# Patient Record
Sex: MALE | Race: WHITE | Employment: OTHER | URBAN - METROPOLITAN AREA
[De-identification: names, ages, dates, MRNs, and addresses within clinical notes are randomized per-mention and may not be internally consistent; named-entity substitution may affect disease eponyms.]

---

## 2017-09-13 DIAGNOSIS — I10 HTN (HYPERTENSION): ICD-10-CM

## 2017-09-14 RX ORDER — LISINOPRIL 20 MG/1
TABLET ORAL
Qty: 30 TABLET | Refills: 8 | OUTPATIENT
Start: 2017-09-14

## 2017-09-25 ENCOUNTER — OFFICE VISIT (OUTPATIENT)
Dept: FAMILY MEDICINE CLINIC | Age: 37
End: 2017-09-25

## 2017-09-25 VITALS
HEIGHT: 72 IN | BODY MASS INDEX: 31.98 KG/M2 | TEMPERATURE: 97.4 F | HEART RATE: 124 BPM | RESPIRATION RATE: 18 BRPM | SYSTOLIC BLOOD PRESSURE: 160 MMHG | WEIGHT: 236.11 LBS | DIASTOLIC BLOOD PRESSURE: 88 MMHG | OXYGEN SATURATION: 98 %

## 2017-09-25 DIAGNOSIS — I10 ESSENTIAL HYPERTENSION: Primary | ICD-10-CM

## 2017-09-25 DIAGNOSIS — R06.2 WHEEZING: ICD-10-CM

## 2017-09-25 PROCEDURE — 99214 OFFICE O/P EST MOD 30 MIN: CPT | Performed by: NURSE PRACTITIONER

## 2017-09-25 RX ORDER — LISINOPRIL 20 MG/1
20 TABLET ORAL DAILY
Qty: 30 TABLET | Refills: 0 | Status: SHIPPED | OUTPATIENT
Start: 2017-09-25 | End: 2017-11-20 | Stop reason: SDUPTHER

## 2017-09-25 RX ORDER — ALBUTEROL SULFATE 90 UG/1
2 AEROSOL, METERED RESPIRATORY (INHALATION) EVERY 6 HOURS PRN
Qty: 1 INHALER | Refills: 0 | Status: SHIPPED | OUTPATIENT
Start: 2017-09-25 | End: 2020-08-17 | Stop reason: ALTCHOICE

## 2017-09-26 ASSESSMENT — ENCOUNTER SYMPTOMS
VOICE CHANGE: 0
EYE REDNESS: 0
COUGH: 0
EYE DISCHARGE: 0
SORE THROAT: 0
SINUS PRESSURE: 0
SHORTNESS OF BREATH: 0
WHEEZING: 1
CHEST TIGHTNESS: 0

## 2017-11-17 ENCOUNTER — TELEPHONE (OUTPATIENT)
Dept: FAMILY MEDICINE CLINIC | Age: 37
End: 2017-11-17

## 2017-11-17 DIAGNOSIS — I10 ESSENTIAL HYPERTENSION: ICD-10-CM

## 2017-11-20 RX ORDER — LISINOPRIL 20 MG/1
20 TABLET ORAL DAILY
Qty: 15 TABLET | Refills: 0 | Status: SHIPPED | OUTPATIENT
Start: 2017-11-20 | End: 2017-12-01 | Stop reason: SDUPTHER

## 2017-12-01 ENCOUNTER — OFFICE VISIT (OUTPATIENT)
Dept: FAMILY MEDICINE CLINIC | Age: 37
End: 2017-12-01
Payer: COMMERCIAL

## 2017-12-01 VITALS
WEIGHT: 239.6 LBS | SYSTOLIC BLOOD PRESSURE: 132 MMHG | HEART RATE: 100 BPM | BODY MASS INDEX: 32.45 KG/M2 | DIASTOLIC BLOOD PRESSURE: 70 MMHG | HEIGHT: 72 IN | OXYGEN SATURATION: 98 % | TEMPERATURE: 97.4 F

## 2017-12-01 DIAGNOSIS — M25.561 CHRONIC PAIN OF BOTH KNEES: ICD-10-CM

## 2017-12-01 DIAGNOSIS — I10 ESSENTIAL HYPERTENSION: Primary | ICD-10-CM

## 2017-12-01 DIAGNOSIS — B35.3 TINEA PEDIS OF BOTH FEET: ICD-10-CM

## 2017-12-01 DIAGNOSIS — G89.29 CHRONIC PAIN OF BOTH KNEES: ICD-10-CM

## 2017-12-01 DIAGNOSIS — Z00.00 ROUTINE GENERAL MEDICAL EXAMINATION AT A HEALTH CARE FACILITY: ICD-10-CM

## 2017-12-01 DIAGNOSIS — M25.562 CHRONIC PAIN OF BOTH KNEES: ICD-10-CM

## 2017-12-01 DIAGNOSIS — Z13.220 SCREENING FOR LIPID DISORDERS: ICD-10-CM

## 2017-12-01 PROCEDURE — 99214 OFFICE O/P EST MOD 30 MIN: CPT | Performed by: NURSE PRACTITIONER

## 2017-12-01 RX ORDER — LISINOPRIL 20 MG/1
20 TABLET ORAL DAILY
Qty: 30 TABLET | Refills: 5 | Status: SHIPPED | OUTPATIENT
Start: 2017-12-01 | End: 2018-08-03 | Stop reason: SDUPTHER

## 2017-12-01 RX ORDER — KETOCONAZOLE 20 MG/G
CREAM TOPICAL
Qty: 30 G | Refills: 1 | Status: SHIPPED | OUTPATIENT
Start: 2017-12-01 | End: 2020-01-03

## 2017-12-01 ASSESSMENT — ENCOUNTER SYMPTOMS
ABDOMINAL PAIN: 0
SHORTNESS OF BREATH: 0
NAUSEA: 0
CHEST TIGHTNESS: 0
BLOOD IN STOOL: 0
DIARRHEA: 0
COUGH: 0
VOMITING: 0
CONSTIPATION: 0

## 2017-12-01 ASSESSMENT — PATIENT HEALTH QUESTIONNAIRE - PHQ9
2. FEELING DOWN, DEPRESSED OR HOPELESS: 0
SUM OF ALL RESPONSES TO PHQ QUESTIONS 1-9: 0
SUM OF ALL RESPONSES TO PHQ9 QUESTIONS 1 & 2: 0
1. LITTLE INTEREST OR PLEASURE IN DOING THINGS: 0

## 2017-12-01 NOTE — PATIENT INSTRUCTIONS
Patient Education        Athlete's Foot: Care Instructions  Your Care Instructions  Athlete's foot is an itchy rash on the foot caused by an infection with a fungus. You can get it by going barefoot in wet public areas, such as swimming pools or locker rooms. Many times there is no clear reason why you get athlete's foot. You can easily treat athlete's foot by putting medicine on your feet for 1 to 6 weeks. In some cases, a doctor may prescribe pills to kill the fungus. Follow-up care is a key part of your treatment and safety. Be sure to make and go to all appointments, and call your doctor if you are having problems. It's also a good idea to know your test results and keep a list of the medicines you take. How can you care for yourself at home? · Your doctor may suggest an over-the counter lotion or spray or may prescribe a medicine. Take your medicines exactly as prescribed. Call your doctor if you think you are having a problem with your medicine. · Keep your feet clean and dry. · When you get dressed, put your socks on before your underwear. This can prevent the fungus from spreading from your feet to your groin. To prevent athlete's foot  · Wear flip-flops or other shower sandals in public locker rooms and showers and by the pool. · Dry between your toes after swimming or bathing. · Wear leather shoes or sandals, which let air get to your feet. · Change your socks as needed so your feet stay as dry as possible. · Use antifungal powder on your feet. When should you call for help? Watch closely for changes in your health, and be sure to contact your doctor if:  · You do not get better as expected. Where can you learn more? Go to https://chguillermo.KargoCard. org and sign in to your FinanceAcar account. Enter M498 in the Brain Sentry box to learn more about \"Athlete's Foot: Care Instructions. \"     If you do not have an account, please click on the \"Sign Up Now\" link.   Current as of:

## 2017-12-01 NOTE — PROGRESS NOTES
Visit Information    Have you changed or started any medications since your last visit including any over-the-counter medicines, vitamins, or herbal medicines? no   Have you stopped taking any of your medications? Is so, why? -  no  Are you having any side effects from any of your medications? - no    Have you seen any other physician or provider since your last visit?  no   Have you had any other diagnostic tests since your last visit?  no   Have you been seen in the emergency room and/or had an admission in a hospital since we last saw you?  no   Have you had your routine dental cleaning in the past 6 months?  no     Do you have an active MyChart account? If no, what is the barrier?   No: declined    Patient Care Team:  Mare Lyles CNP as PCP - General (Nurse Practitioner)  La Willams DO as PCP - S Attributed Provider  Mare Lyles CNP as Referring Physician (Nurse Practitioner)    Medical History Review  Past Medical, Family, and Social History reviewed and  contribute to the patient presenting condition    Health Maintenance   Topic Date Due    HIV screen  05/25/1996    DTaP/Tdap/Td vaccine (1 - Tdap) 01/02/2009    Flu vaccine (1) 09/01/2017

## 2017-12-05 ENCOUNTER — TELEPHONE (OUTPATIENT)
Dept: ORTHOPEDIC SURGERY | Age: 37
End: 2017-12-05

## 2017-12-05 NOTE — TELEPHONE ENCOUNTER
Called patient regarding orthopedic referral received. Left message for patient to call the office at earliest convenience to schedule an appointment.

## 2018-01-09 DIAGNOSIS — M25.561 RIGHT KNEE PAIN, UNSPECIFIED CHRONICITY: Primary | ICD-10-CM

## 2018-01-11 ENCOUNTER — OFFICE VISIT (OUTPATIENT)
Dept: ORTHOPEDIC SURGERY | Age: 38
End: 2018-01-11
Payer: COMMERCIAL

## 2018-01-11 VITALS — BODY MASS INDEX: 31.15 KG/M2 | WEIGHT: 230 LBS | HEIGHT: 72 IN

## 2018-01-11 DIAGNOSIS — M23.92 INTERNAL DERANGEMENT OF LEFT KNEE: ICD-10-CM

## 2018-01-11 DIAGNOSIS — M23.91 INTERNAL DERANGEMENT OF RIGHT KNEE: ICD-10-CM

## 2018-01-11 DIAGNOSIS — M25.562 LEFT KNEE PAIN, UNSPECIFIED CHRONICITY: ICD-10-CM

## 2018-01-11 PROCEDURE — 99243 OFF/OP CNSLTJ NEW/EST LOW 30: CPT | Performed by: ORTHOPAEDIC SURGERY

## 2018-01-11 ASSESSMENT — ENCOUNTER SYMPTOMS
COUGH: 0
APNEA: 0
ABDOMINAL PAIN: 0
CHEST TIGHTNESS: 0
VOMITING: 0

## 2018-01-11 NOTE — PROGRESS NOTES
History    Not on file. Social History Main Topics    Smoking status: Never Smoker    Smokeless tobacco: Current User    Alcohol use No      Comment: 5 a day    Drug use: No    Sexual activity: Not on file     Other Topics Concern    Not on file     Social History Narrative    No narrative on file       Family History:  Family History   Problem Relation Age of Onset    Hypertension Mother     Migraines Mother     Arthritis Father     Hypertension Maternal Grandmother     Hypertension Maternal Grandfather     Diabetes Maternal Grandfather     Arthritis Paternal Grandmother     Stroke Paternal Grandfather     Kidney Disease Paternal Grandfather     Arthritis Paternal Grandfather     Heart Disease Other     Lung Cancer Other          REVIEW OF SYSTEMS:  Review of Systems   Constitutional: Negative for chills and fever. Respiratory: Negative for apnea, cough and chest tightness. Cardiovascular: Negative for chest pain and palpitations. Gastrointestinal: Negative for abdominal pain and vomiting. Genitourinary: Negative for difficulty urinating. Musculoskeletal: Positive for arthralgias (Bilateral knees). Negative for gait problem, joint swelling and myalgias. Neurological: Negative for dizziness, weakness and numbness. I have reviewed the CC, HPI, ROS, PMH, FHX, Social History. I agree with the documentation provided by other staff, residents, and/or medical students and have reviewed their documentation prior to providing my signature indicating agreement. PHYSICAL EXAM:  Ht 6' (1.829 m)   Wt 230 lb (104.3 kg)   BMI 31.19 kg/m²  Body mass index is 31.19 kg/m². Physical Exam  Gen: alert and oriented  Psych:  Appropriate affect; Appropriate knowledge base; Appropriate mood; No hallucinations;    Head: normocephalic atraumatic   Chest: symmetric chest excursion  Pelvis: stable  Ortho Exam  Extremity:  Evaluation of the Right knee shows no erythema, warmth, skin lesions,

## 2018-04-23 ENCOUNTER — OFFICE VISIT (OUTPATIENT)
Dept: FAMILY MEDICINE CLINIC | Age: 38
End: 2018-04-23
Payer: COMMERCIAL

## 2018-04-23 VITALS
SYSTOLIC BLOOD PRESSURE: 148 MMHG | DIASTOLIC BLOOD PRESSURE: 68 MMHG | HEIGHT: 72 IN | TEMPERATURE: 97.5 F | WEIGHT: 245 LBS | RESPIRATION RATE: 18 BRPM | HEART RATE: 99 BPM | OXYGEN SATURATION: 97 % | BODY MASS INDEX: 33.18 KG/M2

## 2018-04-23 DIAGNOSIS — J01.90 ACUTE BACTERIAL SINUSITIS: ICD-10-CM

## 2018-04-23 DIAGNOSIS — B96.89 ACUTE BACTERIAL SINUSITIS: ICD-10-CM

## 2018-04-23 DIAGNOSIS — R51.9 SINUS HEADACHE: Primary | ICD-10-CM

## 2018-04-23 PROCEDURE — 99214 OFFICE O/P EST MOD 30 MIN: CPT | Performed by: NURSE PRACTITIONER

## 2018-04-23 RX ORDER — AMOXICILLIN AND CLAVULANATE POTASSIUM 875; 125 MG/1; MG/1
1 TABLET, FILM COATED ORAL 2 TIMES DAILY
Qty: 20 TABLET | Refills: 0 | Status: SHIPPED | OUTPATIENT
Start: 2018-04-23 | End: 2018-05-03

## 2018-04-23 RX ORDER — FLUTICASONE PROPIONATE 50 MCG
2 SPRAY, SUSPENSION (ML) NASAL DAILY
Qty: 1 BOTTLE | Refills: 0 | Status: SHIPPED | OUTPATIENT
Start: 2018-04-23 | End: 2020-01-03

## 2018-04-23 ASSESSMENT — ENCOUNTER SYMPTOMS
EYE PAIN: 0
VOICE CHANGE: 0
WHEEZING: 0
VOMITING: 0
SORE THROAT: 0
EYE WATERING: 0
BLURRED VISION: 0
VISUAL CHANGE: 0
EYE REDNESS: 0
CHEST TIGHTNESS: 0
NAUSEA: 0
PHOTOPHOBIA: 1
EYE DISCHARGE: 0
RHINORRHEA: 1
SINUS PRESSURE: 0
COUGH: 0
SHORTNESS OF BREATH: 0
SCALP TENDERNESS: 0

## 2018-08-03 DIAGNOSIS — I10 ESSENTIAL HYPERTENSION: ICD-10-CM

## 2018-08-06 RX ORDER — LISINOPRIL 20 MG/1
TABLET ORAL
Qty: 90 TABLET | Refills: 1 | Status: SHIPPED | OUTPATIENT
Start: 2018-08-06 | End: 2019-05-04 | Stop reason: SDUPTHER

## 2018-09-06 ENCOUNTER — OFFICE VISIT (OUTPATIENT)
Dept: FAMILY MEDICINE CLINIC | Age: 38
End: 2018-09-06
Payer: COMMERCIAL

## 2018-09-06 VITALS
SYSTOLIC BLOOD PRESSURE: 132 MMHG | TEMPERATURE: 96.5 F | BODY MASS INDEX: 34.27 KG/M2 | DIASTOLIC BLOOD PRESSURE: 83 MMHG | HEART RATE: 115 BPM | OXYGEN SATURATION: 95 % | WEIGHT: 253 LBS | HEIGHT: 72 IN

## 2018-09-06 DIAGNOSIS — N52.9 ERECTILE DYSFUNCTION, UNSPECIFIED ERECTILE DYSFUNCTION TYPE: ICD-10-CM

## 2018-09-06 DIAGNOSIS — L30.9 DERMATITIS: Primary | ICD-10-CM

## 2018-09-06 DIAGNOSIS — I10 ESSENTIAL HYPERTENSION: ICD-10-CM

## 2018-09-06 PROCEDURE — 99213 OFFICE O/P EST LOW 20 MIN: CPT | Performed by: NURSE PRACTITIONER

## 2018-09-06 RX ORDER — TRIAMCINOLONE ACETONIDE 5 MG/G
CREAM TOPICAL
Qty: 45 G | Refills: 0 | Status: SHIPPED | OUTPATIENT
Start: 2018-09-06 | End: 2020-01-03

## 2018-09-06 RX ORDER — SILDENAFIL 100 MG/1
100 TABLET, FILM COATED ORAL PRN
Qty: 30 TABLET | Refills: 0 | Status: SHIPPED | OUTPATIENT
Start: 2018-09-06 | End: 2020-08-17 | Stop reason: ALTCHOICE

## 2018-09-06 ASSESSMENT — ENCOUNTER SYMPTOMS
DIARRHEA: 0
CHEST TIGHTNESS: 0
BLURRED VISION: 0
COUGH: 0
VOMITING: 0
SHORTNESS OF BREATH: 0
RHINORRHEA: 0
NAUSEA: 0
SORE THROAT: 0
ABDOMINAL PAIN: 0

## 2018-09-06 NOTE — PROGRESS NOTES
Visit Information    Have you changed or started any medications since your last visit including any over-the-counter medicines, vitamins, or herbal medicines? no   Have you stopped taking any of your medications? Is so, why? -  no  Are you having any side effects from any of your medications? - no    Have you seen any other physician or provider since your last visit?  no   Have you had any other diagnostic tests since your last visit?  no   Have you been seen in the emergency room and/or had an admission in a hospital since we last saw you?  no   Have you had your routine dental cleaning in the past 6 months?  no     Do you have an active MyChart account? If no, what is the barrier?   No: declined    Patient Care Team:  JOSE Franks CNP as PCP - General (Nurse Practitioner)  JOSE Franks CNP as PCP - [facility] Attributed Provider  JOSE Franks CNP as Referring Physician (Nurse Practitioner)  JOSE Franks CNP as Referring Physician (Nurse Practitioner)    Medical History Review  Past Medical, Family, and Social History reviewed and  contribute to the patient presenting condition    Health Maintenance   Topic Date Due    Potassium monitoring  1981    Creatinine monitoring  1981    HIV screen  05/25/1996    DTaP/Tdap/Td vaccine (1 - Tdap) 01/02/2009    Flu vaccine (1) 12/25/2018 (Originally 9/1/2018)

## 2018-09-06 NOTE — PROGRESS NOTES
[address] [facility], [place]  [phone]      Jennifer Alfred is a 41 y.o. male who presents today for his  medical conditions/complaints as noted below. Jennifer Alfred is c/o of Hypertension and Rash (stomach and back,started about a wk ago,does itch)  . HPI:   He does not feel rash is poison ivy d/t hx of this and spreads very easily. Also having some ED issues. Sometimes no problem at all. Alcohol does not affect this. sometimes he can't get erection and soemtimes has ejaculation earlier than he should. Hypertension   This is a chronic problem. The current episode started more than 1 year ago. The problem is controlled. Pertinent negatives include no anxiety, blurred vision, chest pain, headaches, malaise/fatigue, palpitations, peripheral edema or shortness of breath. There are no associated agents to hypertension. Risk factors for coronary artery disease include obesity and male gender. Past treatments include ACE inhibitors. The current treatment provides significant improvement. Compliance problems include exercise and diet. There is no history of angina, kidney disease, CAD/MI, CVA or left ventricular hypertrophy. There is no history of chronic renal disease, sleep apnea or a thyroid problem. Rash   This is a new problem. The current episode started in the past 7 days. The problem has been waxing and waning since onset. The affected locations include the abdomen and torso. The rash is characterized by itchiness and redness. He was exposed to plant contact. Pertinent negatives include no anorexia, congestion, cough, diarrhea, facial edema, fatigue, fever, rhinorrhea, shortness of breath, sore throat or vomiting. Past treatments include nothing. The treatment provided no relief.        Past Medical History:   Diagnosis Date    Depression     Hypertension     Substance abuse     tobacco      Past Surgical History:   Procedure Laterality Date    EYE SURGERY Left     metal removed    TOENAIL EXCISION      WISDOM TOOTH EXTRACTION       Family History   Problem Relation Age of Onset    Hypertension Mother    Astrid mSith Migraines Mother     Arthritis Father     No Known Problems Sister     Hypertension Maternal Grandmother     Hypertension Maternal Grandfather     Diabetes Maternal Grandfather     Arthritis Paternal Grandmother     Stroke Paternal Grandfather     Kidney Disease Paternal Grandfather     Arthritis Paternal Grandfather     Heart Disease Other     Lung Cancer Other      Social History   Substance Use Topics    Smoking status: Never Smoker    Smokeless tobacco: Current User    Alcohol use No      Comment: 5 a day      Current Outpatient Prescriptions   Medication Sig Dispense Refill    triamcinolone (ARISTOCORT) 0.5 % cream Apply topically 3 times daily. 45 g 0    sildenafil (VIAGRA) 100 MG tablet Take 1 tablet by mouth as needed for Erectile Dysfunction (cut in half for first dose and see affects) 30 tablet 0    lisinopril (PRINIVIL;ZESTRIL) 20 MG tablet TAKE ONE TABLET BY MOUTH DAILY 90 tablet 1    fluticasone (FLONASE) 50 MCG/ACT nasal spray 2 sprays by Nasal route daily 1 Bottle 0    ketoconazole (NIZORAL) 2 % cream Apply topically daily. 30 g 1    albuterol sulfate HFA (VENTOLIN HFA) 108 (90 Base) MCG/ACT inhaler Inhale 2 puffs into the lungs every 6 hours as needed for Wheezing 1 Inhaler 0     No current facility-administered medications for this visit.       Allergies   Allergen Reactions    Naproxen Other (See Comments)     Excessive somnolence  Has had no problem with other nsaids       Health Maintenance   Topic Date Due    Potassium monitoring  1981    Creatinine monitoring  1981    HIV screen  05/25/1996    DTaP/Tdap/Td vaccine (1 - Tdap) 01/02/2009    Flu vaccine (1) 12/25/2018 (Originally 9/1/2018)       Subjective:      Review of Systems   Constitutional: Negative for appetite change, chills, This Encounter   Procedures    TESTOSTERONE FREE BIO TOTAL     Standing Status:   Future     Standing Expiration Date:   9/6/2019     Orders Placed This Encounter   Medications    triamcinolone (ARISTOCORT) 0.5 % cream     Sig: Apply topically 3 times daily. Dispense:  45 g     Refill:  0    sildenafil (VIAGRA) 100 MG tablet     Sig: Take 1 tablet by mouth as needed for Erectile Dysfunction (cut in half for first dose and see affects)     Dispense:  30 tablet     Refill:  0   labs reprinted to get done and add testosterone  htn controlled  Continue same med/dose  Low salt/caffeine diet and regular exercise for weight loss    Trial cream for rash, has not been spreading  Call INB or worsening      Trial viagra for ED issues. Patient given educational materials - see patient instructions. Discussed use, benefit, and side effects of prescribed medications. All patient questions answered. Pt voiced understanding. Reviewed health maintenance. Instructed to continue current medications, diet and exercise.     Electronically signed by Vita Paredes CNP on 9/6/2018 at 4:37 PM

## 2018-10-27 ENCOUNTER — HOSPITAL ENCOUNTER (OUTPATIENT)
Age: 38
Discharge: HOME | End: 2018-10-27
Payer: COMMERCIAL

## 2018-10-27 DIAGNOSIS — N52.9 ERECTILE DYSFUNCTION, UNSPECIFIED ERECTILE DYSFUNCTION TYPE: ICD-10-CM

## 2018-10-27 DIAGNOSIS — Z13.220 SCREENING FOR LIPID DISORDERS: ICD-10-CM

## 2018-10-27 DIAGNOSIS — Z00.00 ROUTINE GENERAL MEDICAL EXAMINATION AT A HEALTH CARE FACILITY: ICD-10-CM

## 2018-10-27 LAB
ABSOLUTE EOS #: 0.24 K/UL (ref 0–0.44)
ABSOLUTE IMMATURE GRANULOCYTE: 0.04 K/UL (ref 0–0.3)
ABSOLUTE LYMPH #: 2.08 K/UL (ref 1.1–3.7)
ABSOLUTE MONO #: 0.66 K/UL (ref 0.1–1.2)
ALBUMIN SERPL-MCNC: 4.4 G/DL (ref 3.5–5.2)
ALBUMIN/GLOBULIN RATIO: 1.8 (ref 1–2.5)
ALP BLD-CCNC: 71 U/L (ref 40–129)
ALT SERPL-CCNC: 29 U/L (ref 5–41)
ANION GAP SERPL CALCULATED.3IONS-SCNC: 13 MMOL/L (ref 9–17)
AST SERPL-CCNC: 21 U/L
BASOPHILS # BLD: 0 % (ref 0–2)
BASOPHILS ABSOLUTE: 0.03 K/UL (ref 0–0.2)
BILIRUB SERPL-MCNC: 0.71 MG/DL (ref 0.3–1.2)
BUN BLDV-MCNC: 12 MG/DL (ref 6–20)
BUN/CREAT BLD: ABNORMAL (ref 9–20)
CALCIUM SERPL-MCNC: 9.3 MG/DL (ref 8.6–10.4)
CHLORIDE BLD-SCNC: 103 MMOL/L (ref 98–107)
CHOLESTEROL/HDL RATIO: 3.2
CHOLESTEROL: 165 MG/DL
CO2: 27 MMOL/L (ref 20–31)
CREAT SERPL-MCNC: 0.8 MG/DL (ref 0.7–1.2)
DIFFERENTIAL TYPE: ABNORMAL
EOSINOPHILS RELATIVE PERCENT: 3 % (ref 1–4)
GFR AFRICAN AMERICAN: >60 ML/MIN
GFR NON-AFRICAN AMERICAN: >60 ML/MIN
GFR SERPL CREATININE-BSD FRML MDRD: ABNORMAL ML/MIN/{1.73_M2}
GFR SERPL CREATININE-BSD FRML MDRD: ABNORMAL ML/MIN/{1.73_M2}
GLUCOSE BLD-MCNC: 109 MG/DL (ref 70–99)
HCT VFR BLD CALC: 45.7 % (ref 40.7–50.3)
HDLC SERPL-MCNC: 51 MG/DL
HEMOGLOBIN: 15.3 G/DL (ref 13–17)
IMMATURE GRANULOCYTES: 1 %
LDL CHOLESTEROL: 104 MG/DL (ref 0–130)
LYMPHOCYTES # BLD: 29 % (ref 24–43)
MCH RBC QN AUTO: 30.5 PG (ref 25.2–33.5)
MCHC RBC AUTO-ENTMCNC: 33.5 G/DL (ref 28.4–34.8)
MCV RBC AUTO: 91 FL (ref 82.6–102.9)
MONOCYTES # BLD: 9 % (ref 3–12)
NRBC AUTOMATED: 0 PER 100 WBC
PDW BLD-RTO: 12.6 % (ref 11.8–14.4)
PLATELET # BLD: 251 K/UL (ref 138–453)
PLATELET ESTIMATE: ABNORMAL
PMV BLD AUTO: 10 FL (ref 8.1–13.5)
POTASSIUM SERPL-SCNC: 4.6 MMOL/L (ref 3.7–5.3)
RBC # BLD: 5.02 M/UL (ref 4.21–5.77)
RBC # BLD: ABNORMAL 10*6/UL
SEG NEUTROPHILS: 58 % (ref 36–65)
SEGMENTED NEUTROPHILS ABSOLUTE COUNT: 4.04 K/UL (ref 1.5–8.1)
SEX HORMONE BINDING GLOBULIN: 26 NMOL/L (ref 11–80)
SODIUM BLD-SCNC: 143 MMOL/L (ref 135–144)
TESTOSTERONE FREE-NONMALE: 80.7 PG/ML (ref 47–244)
TESTOSTERONE TOTAL: 353 NG/DL (ref 220–1000)
TESTOSTERONE, BIOAVAILABLE: 189.1 NG/DL (ref 130–680)
TOTAL PROTEIN: 6.8 G/DL (ref 6.4–8.3)
TRIGL SERPL-MCNC: 49 MG/DL
VLDLC SERPL CALC-MCNC: NORMAL MG/DL (ref 1–30)
WBC # BLD: 7.1 K/UL (ref 3.5–11.3)
WBC # BLD: ABNORMAL 10*3/UL

## 2018-10-27 PROCEDURE — 84403 ASSAY OF TOTAL TESTOSTERONE: CPT

## 2018-10-27 PROCEDURE — 80061 LIPID PANEL: CPT

## 2018-10-27 PROCEDURE — 80053 COMPREHEN METABOLIC PANEL: CPT

## 2018-10-27 PROCEDURE — 85025 COMPLETE CBC W/AUTO DIFF WBC: CPT

## 2018-10-27 PROCEDURE — 84270 ASSAY OF SEX HORMONE GLOBUL: CPT

## 2018-10-27 PROCEDURE — 36415 COLL VENOUS BLD VENIPUNCTURE: CPT

## 2018-12-19 ENCOUNTER — OFFICE VISIT (OUTPATIENT)
Dept: FAMILY MEDICINE CLINIC | Age: 38
End: 2018-12-19
Payer: COMMERCIAL

## 2018-12-19 VITALS
HEIGHT: 72 IN | BODY MASS INDEX: 34.67 KG/M2 | RESPIRATION RATE: 16 BRPM | WEIGHT: 256 LBS | SYSTOLIC BLOOD PRESSURE: 120 MMHG | HEART RATE: 75 BPM | TEMPERATURE: 98.3 F | DIASTOLIC BLOOD PRESSURE: 84 MMHG

## 2018-12-19 DIAGNOSIS — M25.561 CHRONIC PAIN OF BOTH KNEES: ICD-10-CM

## 2018-12-19 DIAGNOSIS — G89.29 CHRONIC PAIN OF BOTH KNEES: ICD-10-CM

## 2018-12-19 DIAGNOSIS — K21.9 GASTROESOPHAGEAL REFLUX DISEASE WITHOUT ESOPHAGITIS: ICD-10-CM

## 2018-12-19 DIAGNOSIS — M25.562 CHRONIC PAIN OF BOTH KNEES: ICD-10-CM

## 2018-12-19 DIAGNOSIS — K58.0 IRRITABLE BOWEL SYNDROME WITH DIARRHEA: ICD-10-CM

## 2018-12-19 DIAGNOSIS — J40 BRONCHITIS: Primary | ICD-10-CM

## 2018-12-19 PROCEDURE — 99213 OFFICE O/P EST LOW 20 MIN: CPT | Performed by: NURSE PRACTITIONER

## 2018-12-19 RX ORDER — AZITHROMYCIN 250 MG/1
250 TABLET, FILM COATED ORAL SEE ADMIN INSTRUCTIONS
Qty: 6 TABLET | Refills: 0 | Status: SHIPPED | OUTPATIENT
Start: 2018-12-19 | End: 2020-01-03 | Stop reason: SDUPTHER

## 2018-12-19 RX ORDER — FAMOTIDINE 40 MG/1
40 TABLET, FILM COATED ORAL EVERY EVENING
Qty: 30 TABLET | Refills: 5 | Status: SHIPPED | OUTPATIENT
Start: 2018-12-19 | End: 2019-12-22

## 2018-12-19 ASSESSMENT — ENCOUNTER SYMPTOMS
COUGH: 1
RHINORRHEA: 0
WHEEZING: 1
HEARTBURN: 1
SHORTNESS OF BREATH: 0
CHEST TIGHTNESS: 0
ABDOMINAL PAIN: 0
SINUS PAIN: 0
SORE THROAT: 0
VOMITING: 0
DIARRHEA: 1
SINUS PRESSURE: 0
NAUSEA: 0
CONSTIPATION: 0

## 2018-12-19 ASSESSMENT — PATIENT HEALTH QUESTIONNAIRE - PHQ9
SUM OF ALL RESPONSES TO PHQ9 QUESTIONS 1 & 2: 0
SUM OF ALL RESPONSES TO PHQ QUESTIONS 1-9: 0
1. LITTLE INTEREST OR PLEASURE IN DOING THINGS: 0
SUM OF ALL RESPONSES TO PHQ QUESTIONS 1-9: 0
2. FEELING DOWN, DEPRESSED OR HOPELESS: 0

## 2018-12-19 NOTE — PROGRESS NOTES
[address] [facility], [address]  [phone]      Marisabel Duran is a 41 y.o. male who presents today for his  medicalconditions/complaints as noted below. Marisabel Duran is c/o of Cough (5 days, worsening, colored drainage); Gastroesophageal Reflux (ongoing ); and Diarrhea (x 3 weeks)  . HPI:   Cough   This is a new problem. The current episode started in the past 7 days. The problem has been gradually worsening. The problem occurs every few minutes. The cough is productive of purulent sputum. Associated symptoms include heartburn, nasal congestion, postnasal drip and wheezing. Pertinent negatives include no chest pain, chills, ear congestion, ear pain, fever, headaches, myalgias, rhinorrhea, sore throat, shortness of breath or weight loss. The symptoms are aggravated by lying down, exercise and cold air. Risk factors for lung disease include smoking/tobacco exposure. He has tried rest, OTC cough suppressant, cool air and body position changes for the symptoms. The treatment provided no relief. There is no history of asthma, bronchitis, COPD or pneumonia. Gastroesophageal Reflux   He complains of coughing, heartburn and wheezing. He reports no abdominal pain, no chest pain, no nausea, no sore throat or no tooth decay. This is a chronic (since age 16) problem. The problem occurs occasionally. The problem has been waxing and waning. The heartburn does not wake him from sleep. The heartburn does not limit his activity. The heartburn doesn't change with position. The symptoms are aggravated by certain foods, ETOH, lying down and caffeine. Associated symptoms include fatigue. Pertinent negatives include no anemia, melena, muscle weakness, orthopnea or weight loss. Risk factors include caffeine use, ETOH use, lack of exercise, obesity and smoking/tobacco exposure. He has tried an antacid, a PPI and a histamine-2 antagonist for the symptoms.  The treatment provided

## 2018-12-21 ENCOUNTER — TELEPHONE (OUTPATIENT)
Dept: FAMILY MEDICINE CLINIC | Age: 38
End: 2018-12-21

## 2018-12-21 RX ORDER — PREDNISONE 10 MG/1
10 TABLET ORAL 2 TIMES DAILY
Qty: 10 TABLET | Refills: 0 | Status: SHIPPED | OUTPATIENT
Start: 2018-12-21 | End: 2018-12-26

## 2018-12-21 RX ORDER — BENZONATATE 200 MG/1
200 CAPSULE ORAL 3 TIMES DAILY PRN
Qty: 30 CAPSULE | Refills: 0 | Status: SHIPPED | OUTPATIENT
Start: 2018-12-21 | End: 2018-12-28

## 2018-12-21 NOTE — TELEPHONE ENCOUNTER
Patient states maria m peña is not helping and will you rx for something else  Still has horrible sinus pressure and congestion in his chest with a cough . He was up all night coughing. He has been using his wifes inhaler and will you rx for that also.

## 2019-01-23 ENCOUNTER — OFFICE VISIT (OUTPATIENT)
Dept: FAMILY MEDICINE CLINIC | Age: 39
End: 2019-01-23
Payer: COMMERCIAL

## 2019-01-23 VITALS
BODY MASS INDEX: 34.3 KG/M2 | HEIGHT: 72 IN | HEART RATE: 103 BPM | OXYGEN SATURATION: 98 % | SYSTOLIC BLOOD PRESSURE: 128 MMHG | DIASTOLIC BLOOD PRESSURE: 80 MMHG | TEMPERATURE: 98 F

## 2019-01-23 DIAGNOSIS — M25.511 ACUTE PAIN OF RIGHT SHOULDER: Primary | ICD-10-CM

## 2019-01-23 PROCEDURE — 99214 OFFICE O/P EST MOD 30 MIN: CPT | Performed by: NURSE PRACTITIONER

## 2019-01-23 RX ORDER — IBUPROFEN 800 MG/1
800 TABLET ORAL EVERY 8 HOURS PRN
Qty: 30 TABLET | Refills: 0 | Status: SHIPPED | OUTPATIENT
Start: 2019-01-23 | End: 2020-08-17 | Stop reason: ALTCHOICE

## 2019-01-23 RX ORDER — TRAMADOL HYDROCHLORIDE 50 MG/1
50 TABLET ORAL EVERY 6 HOURS PRN
Qty: 10 TABLET | Refills: 0 | Status: SHIPPED | OUTPATIENT
Start: 2019-01-23 | End: 2019-01-28

## 2019-01-23 ASSESSMENT — ENCOUNTER SYMPTOMS
COUGH: 0
FACIAL SWELLING: 0
PHOTOPHOBIA: 0
SHORTNESS OF BREATH: 0

## 2019-02-05 ENCOUNTER — OFFICE VISIT (OUTPATIENT)
Dept: ORTHOPEDIC SURGERY | Age: 39
End: 2019-02-05
Payer: COMMERCIAL

## 2019-02-05 VITALS — HEIGHT: 72 IN | BODY MASS INDEX: 34.67 KG/M2 | WEIGHT: 255.95 LBS

## 2019-02-05 DIAGNOSIS — M25.511 ACUTE PAIN OF RIGHT SHOULDER: Primary | ICD-10-CM

## 2019-02-05 PROCEDURE — 99203 OFFICE O/P NEW LOW 30 MIN: CPT | Performed by: ORTHOPAEDIC SURGERY

## 2019-02-05 RX ORDER — IBUPROFEN 800 MG/1
800 TABLET ORAL 3 TIMES DAILY PRN
Qty: 30 TABLET | Refills: 0 | Status: SHIPPED | OUTPATIENT
Start: 2019-02-05 | End: 2019-09-06

## 2019-02-26 DIAGNOSIS — M23.92 INTERNAL DERANGEMENT OF LEFT KNEE: Primary | ICD-10-CM

## 2019-02-26 DIAGNOSIS — M23.91 INTERNAL DERANGEMENT OF RIGHT KNEE: ICD-10-CM

## 2019-03-13 ENCOUNTER — HOSPITAL ENCOUNTER (OUTPATIENT)
Dept: MRI IMAGING | Age: 39
Discharge: HOME | End: 2019-03-15
Payer: COMMERCIAL

## 2019-03-13 DIAGNOSIS — M23.92 INTERNAL DERANGEMENT OF LEFT KNEE: ICD-10-CM

## 2019-03-13 DIAGNOSIS — M23.91 INTERNAL DERANGEMENT OF RIGHT KNEE: ICD-10-CM

## 2019-03-13 PROCEDURE — 73721 MRI JNT OF LWR EXTRE W/O DYE: CPT

## 2019-03-28 ENCOUNTER — OFFICE VISIT (OUTPATIENT)
Dept: ORTHOPEDIC SURGERY | Age: 39
End: 2019-03-28
Payer: COMMERCIAL

## 2019-03-28 VITALS — HEIGHT: 72 IN | BODY MASS INDEX: 34.54 KG/M2 | WEIGHT: 255 LBS

## 2019-03-28 DIAGNOSIS — S83.242A ACUTE MEDIAL MENISCUS TEAR OF LEFT KNEE, INITIAL ENCOUNTER: ICD-10-CM

## 2019-03-28 DIAGNOSIS — S83.241A ACUTE MEDIAL MENISCUS TEAR OF RIGHT KNEE, INITIAL ENCOUNTER: Primary | ICD-10-CM

## 2019-03-28 DIAGNOSIS — S83.412A TEAR OF MEDIAL COLLATERAL LIGAMENT OF LEFT KNEE, INITIAL ENCOUNTER: ICD-10-CM

## 2019-03-28 PROCEDURE — 20610 DRAIN/INJ JOINT/BURSA W/O US: CPT | Performed by: ORTHOPAEDIC SURGERY

## 2019-03-28 PROCEDURE — 99213 OFFICE O/P EST LOW 20 MIN: CPT | Performed by: ORTHOPAEDIC SURGERY

## 2019-03-28 RX ORDER — BUPIVACAINE HYDROCHLORIDE 2.5 MG/ML
2 INJECTION, SOLUTION INFILTRATION; PERINEURAL ONCE
Status: COMPLETED | OUTPATIENT
Start: 2019-03-28 | End: 2019-03-29

## 2019-03-28 RX ORDER — METHYLPREDNISOLONE ACETATE 80 MG/ML
80 INJECTION, SUSPENSION INTRA-ARTICULAR; INTRALESIONAL; INTRAMUSCULAR; SOFT TISSUE ONCE
Status: COMPLETED | OUTPATIENT
Start: 2019-03-28 | End: 2019-03-29

## 2019-03-28 ASSESSMENT — ENCOUNTER SYMPTOMS
VOMITING: 0
COUGH: 0
ABDOMINAL PAIN: 0
APNEA: 0
CHEST TIGHTNESS: 0

## 2019-03-29 RX ADMIN — BUPIVACAINE HYDROCHLORIDE 5 MG: 2.5 INJECTION, SOLUTION INFILTRATION; PERINEURAL at 14:39

## 2019-03-29 RX ADMIN — METHYLPREDNISOLONE ACETATE 80 MG: 80 INJECTION, SUSPENSION INTRA-ARTICULAR; INTRALESIONAL; INTRAMUSCULAR; SOFT TISSUE at 14:40

## 2019-09-06 ENCOUNTER — OFFICE VISIT (OUTPATIENT)
Dept: FAMILY MEDICINE CLINIC | Age: 39
End: 2019-09-06
Payer: COMMERCIAL

## 2019-09-06 VITALS
DIASTOLIC BLOOD PRESSURE: 85 MMHG | HEART RATE: 86 BPM | TEMPERATURE: 98 F | RESPIRATION RATE: 18 BRPM | OXYGEN SATURATION: 98 % | WEIGHT: 258 LBS | SYSTOLIC BLOOD PRESSURE: 130 MMHG | BODY MASS INDEX: 34.99 KG/M2

## 2019-09-06 DIAGNOSIS — F10.10 ALCOHOL ABUSE: ICD-10-CM

## 2019-09-06 DIAGNOSIS — S62.639A FRACTURE OF DISTAL PHALANX OF FINGER OF RIGHT HAND: ICD-10-CM

## 2019-09-06 DIAGNOSIS — M20.011 MALLET FINGER OF RIGHT HAND: Primary | ICD-10-CM

## 2019-09-06 PROCEDURE — 99213 OFFICE O/P EST LOW 20 MIN: CPT | Performed by: NURSE PRACTITIONER

## 2019-09-06 ASSESSMENT — ENCOUNTER SYMPTOMS
RESPIRATORY NEGATIVE: 1
CHEST TIGHTNESS: 0
COUGH: 0
SHORTNESS OF BREATH: 0
WHEEZING: 0

## 2019-09-06 NOTE — PATIENT INSTRUCTIONS
types of counseling, and alcohol education. · Medicines that help to:  ? Reduce withdrawal symptoms and help you safely stop drinking. ? Reduce cravings for alcohol. · Support groups. These groups include Alcoholics Anonymous and [employer] (Self-Management and Recovery Training). Some people are able to stop or cut back on drinking with help from a counselor. People who have moderate to severe alcohol use disorder may need medical treatment. They may need to stay in a hospital or treatment center. You may have a treatment team to help you. This team may include a psychologist or psychiatrist, counselors, doctors, social workers, nurses, and a . A  helps plan and manage your treatment. Follow-up care is a key part of your treatment and safety. Be sure to make and go to all appointments, and call your doctor if you are having problems. It's also a good idea to know your test results and keep a list of the medicines you take. Where can you learn more? Go to https://Power [employer] Microelectronics.Flavorvanil. org and sign in to your OpenSilo account. Enter [id number] in the minicabit box to learn more about \"Learning About Alcohol Use Disorder. \"     If you do not have an account, please click on the \"Sign Up Now\" link. Current as of: February 5, 2019  Content Version: 12.1  © 3175-8016 Healthwise, YoungCurrent. Care instructions adapted under license by [facility] ([facility]). If you have questions about a medical condition or this instruction, always ask your healthcare professional. [place] [address] any warranty or liability for your use of this information. Patient Education        Finger Fracture: Care Instructions  Your Care Instructions    Breaks in the bones of the finger usually heal well in about 3 to 4 weeks. The pain and swelling from a broken finger can last for weeks. But it should steadily improve, starting a few days after you break it.   It is very important

## 2020-01-03 ENCOUNTER — OFFICE VISIT (OUTPATIENT)
Dept: FAMILY MEDICINE CLINIC | Age: 40
End: 2020-01-03
Payer: COMMERCIAL

## 2020-01-03 VITALS
BODY MASS INDEX: 32.91 KG/M2 | HEIGHT: 72 IN | WEIGHT: 243 LBS | OXYGEN SATURATION: 97 % | HEART RATE: 80 BPM | TEMPERATURE: 97.4 F | DIASTOLIC BLOOD PRESSURE: 82 MMHG | SYSTOLIC BLOOD PRESSURE: 128 MMHG

## 2020-01-03 PROCEDURE — 99213 OFFICE O/P EST LOW 20 MIN: CPT | Performed by: NURSE PRACTITIONER

## 2020-01-03 PROCEDURE — 90471 IMMUNIZATION ADMIN: CPT | Performed by: NURSE PRACTITIONER

## 2020-01-03 PROCEDURE — 90715 TDAP VACCINE 7 YRS/> IM: CPT | Performed by: NURSE PRACTITIONER

## 2020-01-03 RX ORDER — FAMOTIDINE 40 MG/1
TABLET, FILM COATED ORAL
Qty: 30 TABLET | Refills: 3 | Status: SHIPPED | OUTPATIENT
Start: 2020-01-03 | End: 2020-07-27 | Stop reason: SDUPTHER

## 2020-01-03 RX ORDER — AZITHROMYCIN 250 MG/1
250 TABLET, FILM COATED ORAL SEE ADMIN INSTRUCTIONS
Qty: 6 TABLET | Refills: 0 | Status: SHIPPED | OUTPATIENT
Start: 2020-01-03 | End: 2020-01-08

## 2020-01-03 ASSESSMENT — ENCOUNTER SYMPTOMS
CHEST TIGHTNESS: 1
HEMOPTYSIS: 0
RHINORRHEA: 0
ABDOMINAL PAIN: 0
DIARRHEA: 0
NAUSEA: 0
TROUBLE SWALLOWING: 0
SHORTNESS OF BREATH: 0
WHEEZING: 0
COUGH: 1
SORE THROAT: 0
SINUS PRESSURE: 0

## 2020-01-03 NOTE — PROGRESS NOTES
Visit Information    Have you changed or started any medications since your last visit including any over-the-counter medicines, vitamins, or herbal medicines? no   Have you stopped taking any of your medications? Is so, why? -  no  Are you having any side effects from any of your medications? - no    Have you seen any other physician or provider since your last visit?  no   Have you had any other diagnostic tests since your last visit?  no   Have you been seen in the emergency room and/or had an admission in a hospital since we last saw you?  no   Have you had your routine dental cleaning in the past 6 months?  no     Do you have an active MyChart account? If no, what is the barrier? No: declined    Patient Care Team:  JOSE Sol CNP as PCP - General (Nurse Practitioner)  JOSE Sol CNP as PCP - [facility] Provider  JOSE Sol CNP as Referring Physician (Nurse Practitioner)  JOSE Sol CNP as Referring Physician (Nurse Practitioner)    Medical History Review  Past Medical, Family, and Social History reviewed and  contribute to the patient presenting condition    Health Maintenance   Topic Date Due    Varicella Vaccine (1 of 2 - 2-dose childhood series) 05/25/1982    Pneumococcal 0-64 years Vaccine (1 of 1 - PPSV23) 05/25/1987    DTaP/Tdap/Td vaccine (1 - Tdap) 05/25/1992    HIV screen  05/25/1996    Flu vaccine (1) 09/01/2019    Potassium monitoring  10/27/2019    Creatinine monitoring  10/27/2019     After obtaining consent, and per orders of Lizzy AXEL, injection of Tdap given in Right deltoid by Erica Zheng. Patient instructed to remain in clinic for 20 minutes afterwards, and to report any adverse reaction to me immediately.
drip. Negative for ear discharge, ear pain, hearing loss, rhinorrhea, sinus pressure, sneezing, sore throat and trouble swallowing. Respiratory: Positive for cough and chest tightness. Negative for hemoptysis, shortness of breath and wheezing. Cardiovascular: Negative for chest pain and palpitations. Gastrointestinal: Negative for abdominal pain, diarrhea and nausea. Musculoskeletal: Negative for myalgias. Neurological: Positive for headaches. Negative for dizziness. Hematological: Negative for adenopathy. Psychiatric/Behavioral: Positive for sleep disturbance. Objective:      Physical Exam  Vitals signs and nursing note reviewed. Constitutional:       General: He is not in acute distress. Appearance: Normal appearance. He is well-developed. He is not ill-appearing or diaphoretic. Comments: /82 (Site: Right Upper Arm, Position: Sitting, Cuff Size: Medium Adult)   Pulse 80   Temp 97.4 °F (36.3 °C) (Tympanic)   Ht 6' 0.44\" (1.84 m)   Wt 243 lb (110.2 kg)   SpO2 97%   BMI 32.56 kg/m²      HENT:      Head: Normocephalic and atraumatic. Right Ear: Hearing, tympanic membrane, ear canal and external ear normal.      Left Ear: Hearing, tympanic membrane, ear canal and external ear normal.      Nose: Nose normal.      Right Sinus: No maxillary sinus tenderness or frontal sinus tenderness. Left Sinus: No maxillary sinus tenderness or frontal sinus tenderness. Mouth/Throat:      Pharynx: Uvula midline. No oropharyngeal exudate. Neck:      Musculoskeletal: Normal range of motion and neck supple. Cardiovascular:      Rate and Rhythm: Normal rate and regular rhythm. Heart sounds: Normal heart sounds. Pulmonary:      Effort: Pulmonary effort is normal. No respiratory distress. Breath sounds: Normal breath sounds. No wheezing. Lymphadenopathy:      Cervical: No cervical adenopathy. Skin:     General: Skin is warm and dry.       Capillary Refill:

## 2020-01-16 ENCOUNTER — OFFICE VISIT (OUTPATIENT)
Dept: FAMILY MEDICINE CLINIC | Age: 40
End: 2020-01-16
Payer: COMMERCIAL

## 2020-01-16 VITALS
SYSTOLIC BLOOD PRESSURE: 131 MMHG | HEART RATE: 103 BPM | BODY MASS INDEX: 32.51 KG/M2 | DIASTOLIC BLOOD PRESSURE: 80 MMHG | WEIGHT: 240 LBS | TEMPERATURE: 98.3 F | HEIGHT: 72 IN | OXYGEN SATURATION: 97 %

## 2020-01-16 PROCEDURE — 99214 OFFICE O/P EST MOD 30 MIN: CPT | Performed by: NURSE PRACTITIONER

## 2020-01-16 RX ORDER — BROMPHENIRAMINE MALEATE, PSEUDOEPHEDRINE HYDROCHLORIDE, AND DEXTROMETHORPHAN HYDROBROMIDE 2; 30; 10 MG/5ML; MG/5ML; MG/5ML
5 SYRUP ORAL 4 TIMES DAILY PRN
Qty: 118 ML | Refills: 0 | Status: SHIPPED | OUTPATIENT
Start: 2020-01-16 | End: 2020-08-17 | Stop reason: ALTCHOICE

## 2020-01-16 RX ORDER — DOXYCYCLINE HYCLATE 100 MG/1
100 CAPSULE ORAL 2 TIMES DAILY
Qty: 20 CAPSULE | Refills: 0 | Status: SHIPPED | OUTPATIENT
Start: 2020-01-16 | End: 2020-01-26

## 2020-01-16 ASSESSMENT — ENCOUNTER SYMPTOMS
EYE DISCHARGE: 0
WHEEZING: 0
VOICE CHANGE: 0
SINUS PRESSURE: 0
SORE THROAT: 0
COUGH: 1
EYE REDNESS: 0
SHORTNESS OF BREATH: 1
CHEST TIGHTNESS: 0

## 2020-01-16 NOTE — PROGRESS NOTES
[address]  [address] [facility] [address] [address]  Dept: [phone]  Dept Fax: [phone]     Lavern Quevedo is a 46 y.o. male who presents to the urgent care today for his medicalconditions/complaints as noted below. Lavern Quevedo is c/o of Cough (coughing so hard that it is causing a headache. ) and Chest Congestion (x 2 months, was given a z-mariana but now is feeling worse. States with the congestion he is having SOB)    HPI:      Cough   This is a new problem. Episode onset: 1-2 months ago. The problem has been waxing and waning. The cough is non-productive. Associated symptoms include headaches (due to coughing), nasal congestion and shortness of breath (slight due to the cough). Pertinent negatives include no chest pain, chills, ear pain, eye redness, fever, myalgias, postnasal drip, rash, sore throat or wheezing. Risk factors for lung disease include smoking/tobacco exposure. Treatments tried: azithromycin, theraflu, robutussin, cough drops. The treatment provided no relief.      Past Medical History:   Diagnosis Date    Back pain     Depression     Gastroesophageal reflux disease without esophagitis 12/19/2018    Hypertension     Substance abuse ([facility])     tobacco      Current Outpatient Medications   Medication Sig Dispense Refill    doxycycline hyclate (VIBRAMYCIN) 100 MG capsule Take 1 capsule by mouth 2 times daily for 10 days 20 capsule 0    brompheniramine-pseudoephedrine-DM 2-30-10 MG/5ML syrup Take 5 mLs by mouth 4 times daily as needed for Cough 118 mL 0    famotidine (PEPCID) 40 MG tablet TAKE ONE TABLET BY MOUTH EVERY EVENING 30 tablet 3    lisinopril (PRINIVIL;ZESTRIL) 20 MG tablet TAKE ONE TABLET BY MOUTH DAILY 30 tablet 5    ibuprofen (ADVIL;MOTRIN) 800 MG tablet Take 1 tablet by mouth every 8 hours as needed for Pain 30 tablet 0    sildenafil (VIAGRA) 100 MG tablet Take 1 tablet by mouth as needed for Erectile Dysfunction (cut in half for first dose and see affects) 30 tablet 0    albuterol sulfate HFA (VENTOLIN HFA) 108 (90 Base) MCG/ACT inhaler Inhale 2 puffs into the lungs every 6 hours as needed for Wheezing (Patient not taking: Reported on 9/6/2019) 1 Inhaler 0     No current facility-administered medications for this visit. Allergies   Allergen Reactions    Naproxen Other (See Comments)     Excessive somnolence  Has had no problem with other nsaids     Reviewed PMH, SH, and FH with the patient and updated. Subjective:      Review of Systems   Constitutional: Negative for chills, fatigue and fever. HENT: Positive for congestion (mild). Negative for ear discharge, ear pain, postnasal drip, sinus pressure, sneezing, sore throat and voice change. Eyes: Negative for discharge and redness. Respiratory: Positive for cough and shortness of breath (slight due to the cough). Negative for chest tightness and wheezing. Cardiovascular: Negative. Negative for chest pain. Musculoskeletal: Negative for myalgias. Skin: Negative for rash. Neurological: Positive for headaches (due to coughing). Negative for dizziness, weakness and light-headedness. Hematological: Negative for adenopathy. All other systems reviewed and are negative. Objective:      Physical Exam  Vitals signs and nursing note reviewed. Constitutional:       General: He is not in acute distress. Appearance: Normal appearance. He is well-developed. He is not ill-appearing, toxic-appearing or diaphoretic. HENT:      Head: Normocephalic. Right Ear: Tympanic membrane and external ear normal.      Left Ear: Tympanic membrane and external ear normal.      Nose: Nose normal.      Right Sinus: No maxillary sinus tenderness or frontal sinus tenderness. Left Sinus: No maxillary sinus tenderness or frontal sinus tenderness. Mouth/Throat:      Pharynx: Oropharyngeal exudate (PND) and posterior oropharyngeal erythema present.

## 2020-01-16 NOTE — PATIENT INSTRUCTIONS
Patient Education        Sinusitis: Care Instructions  Your Care Instructions    Sinusitis is an infection of the lining of the sinus cavities in your head. Sinusitis often follows a cold. It causes pain and pressure in your head and face. In most cases, sinusitis gets better on its own in 1 to 2 weeks. But some mild symptoms may last for several weeks. Sometimes antibiotics are needed. Follow-up care is a key part of your treatment and safety. Be sure to make and go to all appointments, and call your doctor if you are having problems. It's also a good idea to know your test results and keep a list of the medicines you take. How can you care for yourself at home? · Take an over-the-counter pain medicine, such as acetaminophen (Tylenol), ibuprofen (Advil, Motrin), or naproxen (Aleve). Read and follow all instructions on the label. · If the doctor prescribed antibiotics, take them as directed. Do not stop taking them just because you feel better. You need to take the full course of antibiotics. · Be careful when taking over-the-counter cold or flu medicines and Tylenol at the same time. Many of these medicines have acetaminophen, which is Tylenol. Read the labels to make sure that you are not taking more than the recommended dose. Too much acetaminophen (Tylenol) can be harmful. · Breathe warm, moist air from a steamy shower, a hot bath, or a sink filled with hot water. Avoid cold, dry air. Using a humidifier in your home may help. Follow the directions for cleaning the machine. · Use saline (saltwater) nasal washes to help keep your nasal passages open and wash out mucus and bacteria. You can buy saline nose drops at a grocery store or drugstore. Or you can make your own at home by adding 1 teaspoon of salt and 1 teaspoon of baking soda to 2 cups of distilled water. If you make your own, fill a bulb syringe with the solution, insert the tip into your nostril, and squeeze gently. Jennifer Sale your nose.   · Put a hot, wet towel or a warm gel pack on your face 3 or 4 times a day for 5 to 10 minutes each time. · Try a decongestant nasal spray like oxymetazoline (Afrin). Do not use it for more than 3 days in a row. Using it for more than 3 days can make your congestion worse. When should you call for help? Call your doctor now or seek immediate medical care if:    · You have new or worse swelling or redness in your face or around your eyes.     · You have a new or higher fever.    Watch closely for changes in your health, and be sure to contact your doctor if:    · You have new or worse facial pain.     · The mucus from your nose becomes thicker (like pus) or has new blood in it.     · You are not getting better as expected. Where can you learn more? Go to [email or web address]://[email or web address] org and sign in to your [employer] Mobile account. Enter Y088 in the [facility] box to learn more about \"Sinusitis: Care Instructions. \"     If you do not have an account, please click on the \"Sign Up Now\" link. Current as of: July 28, 2019  Content Version: 12.3  © 2071-0950 [employer]. Care instructions adapted under license by [facility] ([facility]). If you have questions about a medical condition or this instruction, always ask your healthcare professional. [place] [address] any warranty or liability for your use of this information. Patient Education        Cough: Care Instructions  Your Care Instructions    A cough is your body's response to something that bothers your throat or airways. Many things can cause a cough. You might cough because of a cold or the flu, bronchitis, or asthma. Smoking, postnasal drip, allergies, and stomach acid that backs up into your throat also can cause coughs. A cough is a symptom, not a disease. Most coughs stop when the cause, such as a cold, goes away. You can take a few steps at home to cough less and feel better.   Follow-up care is a key part of your adapted under license by [facility] ([facility]). If you have questions about a medical condition or this instruction, always ask your healthcare professional. [address] any warranty or liability for your use of this information.

## 2020-01-26 RX ORDER — LISINOPRIL 20 MG/1
TABLET ORAL
Qty: 30 TABLET | Refills: 4 | Status: SHIPPED | OUTPATIENT
Start: 2020-01-26 | End: 2020-07-27 | Stop reason: SDUPTHER

## 2020-07-27 RX ORDER — FAMOTIDINE 40 MG/1
TABLET, FILM COATED ORAL
Qty: 30 TABLET | Refills: 0 | Status: SHIPPED | OUTPATIENT
Start: 2020-07-27 | End: 2020-08-17 | Stop reason: SDUPTHER

## 2020-07-27 RX ORDER — LISINOPRIL 20 MG/1
TABLET ORAL
Qty: 30 TABLET | Refills: 0 | Status: SHIPPED | OUTPATIENT
Start: 2020-07-27 | End: 2020-08-17 | Stop reason: SDUPTHER

## 2020-08-17 ENCOUNTER — OFFICE VISIT (OUTPATIENT)
Dept: FAMILY MEDICINE CLINIC | Age: 40
End: 2020-08-17
Payer: COMMERCIAL

## 2020-08-17 VITALS
OXYGEN SATURATION: 98 % | TEMPERATURE: 97.4 F | HEIGHT: 72 IN | HEART RATE: 98 BPM | BODY MASS INDEX: 35.54 KG/M2 | WEIGHT: 262.4 LBS | DIASTOLIC BLOOD PRESSURE: 76 MMHG | SYSTOLIC BLOOD PRESSURE: 128 MMHG

## 2020-08-17 PROCEDURE — 99395 PREV VISIT EST AGE 18-39: CPT | Performed by: NURSE PRACTITIONER

## 2020-08-17 RX ORDER — LISINOPRIL 20 MG/1
TABLET ORAL
Qty: 90 TABLET | Refills: 3 | Status: SHIPPED | OUTPATIENT
Start: 2020-08-17 | End: 2021-09-07

## 2020-08-17 RX ORDER — FAMOTIDINE 40 MG/1
TABLET, FILM COATED ORAL
Qty: 90 TABLET | Refills: 3 | Status: SHIPPED | OUTPATIENT
Start: 2020-08-17 | End: 2020-08-30

## 2020-08-17 ASSESSMENT — ENCOUNTER SYMPTOMS
SORE THROAT: 0
SHORTNESS OF BREATH: 0
COUGH: 0
CONSTIPATION: 0
NAUSEA: 0
RHINORRHEA: 0
DIARRHEA: 0
ABDOMINAL PAIN: 0
COLOR CHANGE: 0
RECTAL PAIN: 0
EYE PAIN: 0
BACK PAIN: 1
CHEST TIGHTNESS: 0

## 2020-08-17 NOTE — PROGRESS NOTES
[address]  [place], [place] Hameed  [phone]      Luberta Cooks is a 45 y.o. male who presents today for his  medicalconditions/complaints as noted below. Luberta Cooks is c/o of Hypertension and Annual Exam  .    HPI:    HPI  Pt. Here for med check and annual exam. He is doing well and has a healthy 11 month old baby at home. He is working full time. He denies SE with meds. He is trying to exercise with his wife. He is still using chew tobacco and not ready to quit. He would like a letter for medical necessity for a sleep number bed for chronic back pain. Past Medical History:   Diagnosis Date    Back pain     Depression     Gastroesophageal reflux disease without esophagitis 12/19/2018    Hypertension     Substance abuse ([facility] [address]     tobacco      Past Surgical History:   Procedure Laterality Date    EYE SURGERY Left     metal removed    TOENAIL EXCISION      WISDOM TOOTH EXTRACTION       Family History   Problem Relation Age of Onset    Hypertension Mother    [facility] Mother     Arthritis Father     No Known Problems Sister     Hypertension Maternal Grandmother     Hypertension Maternal Grandfather     Diabetes Maternal Grandfather     Arthritis Paternal Grandmother     Stroke Paternal Grandfather     Kidney Disease Paternal Grandfather     Arthritis Paternal Grandfather     Heart Disease Other     Lung Cancer Other      Social History     Tobacco Use    Smoking status: Never Smoker    Smokeless tobacco: Current User     Types: Chew   Substance Use Topics    Alcohol use:  Yes     Alcohol/week: 6.0 standard drinks     Types: 6 Cans of beer per week     Comment: 5 a day      Current Outpatient Medications   Medication Sig Dispense Refill    lisinopril (PRINIVIL;ZESTRIL) 20 MG tablet Take 1 tab po qd 90 tablet 3    famotidine (PEPCID) 40 MG tablet TAKE ONE TABLET BY MOUTH EVERY EVENING 90 tablet 3     No current facility-administered medications for this visit. Allergies   Allergen Reactions    Naproxen Other (See Comments)     Excessive somnolence  Has had no problem with other nsaids       Health Maintenance   Topic Date Due    Varicella vaccine (1 of 2 - 2-dose childhood series) 05/25/1982    HIV screen  05/25/1996    Potassium monitoring  10/27/2019    Creatinine monitoring  10/27/2019    Pneumococcal 0-64 years Vaccine (1 of 1 - PPSV23) 07/28/2030 (Originally 5/25/1987)    Flu vaccine (1) 09/01/2020    DTaP/Tdap/Td vaccine (2 - Td) 01/03/2030    Hepatitis A vaccine  Aged Out    Hepatitis B vaccine  Aged Out    Hib vaccine  Aged Out    Meningococcal (ACWY) vaccine  Aged Out       Subjective:      Review of Systems   Constitutional: Negative for activity change, chills, fatigue, fever and unexpected weight change. HENT: Negative for congestion, ear pain, postnasal drip, rhinorrhea and sore throat. Eyes: Negative for pain and visual disturbance. Respiratory: Negative for cough, chest tightness and shortness of breath. Cardiovascular: Negative for chest pain, palpitations and leg swelling. Gastrointestinal: Negative for abdominal pain, constipation, diarrhea, nausea and rectal pain. Endocrine: Negative for polydipsia and polyuria. Genitourinary: Negative for difficulty urinating, dysuria, hematuria, penile pain, penile swelling, scrotal swelling and testicular pain. Musculoskeletal: Positive for back pain (chronic). Negative for myalgias. Skin: Negative for color change. Neurological: Negative for dizziness, weakness, numbness and headaches. Psychiatric/Behavioral: Positive for sleep disturbance. Negative for dysphoric mood. The patient is not nervous/anxious. Objective:      Physical Exam  Vitals signs and nursing note reviewed. Constitutional:       General: He is not in acute distress. Appearance: Normal appearance. He is well-developed. He is obese.  He is not ill-appearing or diaphoretic. Comments: /76 (Site: Right Upper Arm, Position: Sitting, Cuff Size: Medium Adult)   Pulse 98   Temp 97.4 °F (36.3 °C) (Tympanic)   Ht 6' 0.44\" (1.84 m)   Wt 262 lb 6.4 oz (119 kg)   SpO2 98%   BMI 35.16 kg/m²      HENT:      Head: Normocephalic and atraumatic. Right Ear: Hearing, tympanic membrane, ear canal and external ear normal.      Left Ear: Hearing, tympanic membrane, ear canal and external ear normal.      Nose: Nose normal.      Mouth/Throat:      Pharynx: No oropharyngeal exudate. Eyes:      Conjunctiva/sclera: Conjunctivae normal.      Pupils: Pupils are equal, round, and reactive to light. Neck:      Musculoskeletal: Normal range of motion and neck supple. Thyroid: No thyromegaly. Vascular: No JVD. Cardiovascular:      Rate and Rhythm: Normal rate and regular rhythm. Heart sounds: Normal heart sounds. No murmur. Comments: NO LE edema  Pulmonary:      Effort: Pulmonary effort is normal. No respiratory distress. Breath sounds: Normal breath sounds. No wheezing. Abdominal:      General: Bowel sounds are normal.      Palpations: Abdomen is soft. Tenderness: There is no abdominal tenderness. Musculoskeletal: Normal range of motion. Lymphadenopathy:      Cervical: No cervical adenopathy. Skin:     General: Skin is warm and dry. Capillary Refill: Capillary refill takes less than 2 seconds. Findings: No rash. Neurological:      General: No focal deficit present. Mental Status: He is alert and oriented to person, place, and time. Motor: No abnormal muscle tone. Coordination: Coordination normal.   Psychiatric:         Mood and Affect: Mood normal.         Behavior: Behavior normal.         Thought Content: Thought content normal.         Judgment: Judgment normal.         Assessment:       Diagnosis Orders   1.  Routine general medical examination at a health care facility  Comprehensive Metabolic Panel Hemoglobin A1C   2. Essential hypertension  lisinopril (PRINIVIL;ZESTRIL) 20 MG tablet   3. Screening for diabetes mellitus  Hemoglobin A1C   4. Gastroesophageal reflux disease without esophagitis  famotidine (PEPCID) 40 MG tablet   5. Wheezing       Wt Readings from Last 3 Encounters:   08/17/20 262 lb 6.4 oz (119 kg)   01/16/20 240 lb (108.9 kg)   01/03/20 243 lb (110.2 kg)     BP Readings from Last 3 Encounters:   08/17/20 128/76   01/16/20 131/80   01/03/20 128/82     Lab Results   Component Value Date    CHOL 165 10/27/2018     Lab Results   Component Value Date    TRIG 49 10/27/2018     Lab Results   Component Value Date    HDL 51 10/27/2018     Lab Results   Component Value Date    LDLCHOLESTEROL 104 10/27/2018     Lab Results   Component Value Date    VLDL NOT REPORTED 10/27/2018     Lab Results   Component Value Date    CHOLHDLRATIO 3.2 10/27/2018       Plan:      Return in about 1 year (around 8/17/2021) for routine healthcare visit. Orders Placed This Encounter   Procedures    Comprehensive Metabolic Panel     Standing Status:   Future     Standing Expiration Date:   8/17/2021    Hemoglobin A1C     Standing Status:   Future     Standing Expiration Date:   8/17/2021     Orders Placed This Encounter   Medications    lisinopril (PRINIVIL;ZESTRIL) 20 MG tablet     Sig: Take 1 tab po qd     Dispense:  90 tablet     Refill:  3    famotidine (PEPCID) 40 MG tablet     Sig: TAKE ONE TABLET BY MOUTH EVERY EVENING     Dispense:  90 tablet     Refill:  3     Overall he is doing well  Declines lipids, but will update a1c and CMP  Will call with test results  Patient advised to follow heart healthy, low fat  diet and 150 minutes of cardiovascular exercise per week   Avoid tobacco, drugs and alcohol  The nature of sun-induced photo-aging and skin cancers is discussed. Sun avoidance, protective clothing, and the use of 30-SPF sunscreens is advised.  Observe closely for skin damage/changes, and call if such occurs. Refills given    Declines any vaccines    Patient given educational materials - see patient instructions. Discussed use,benefit, and side effects of prescribed medications. All patient questions answered. Pt voiced understanding. Reviewed health maintenance. Instructed to continue currentmedications, diet and exercise.     Electronically signed by Zain Paredes CNP on 8/17/2020 at 5:04 PM

## 2020-08-17 NOTE — LETTER
[address] [place] [address]  Phone: [phone]  Fax: [phone]    JOSE Velasquez CNP        August 17, 2020    [address] [address] [address]      To whom this may concern:   Rhe above named patient is under my care. He has ha history of chronic back pain. I feel it is medically necessary for him to use a sleep by number mattress for this chronic back pain. If you have any questions or concerns, please don't hesitate to call.     Sincerely,        JOSE Velasquez CNP

## 2020-08-17 NOTE — PROGRESS NOTES
Visit Information    Have you changed or started any medications since your last visit including any over-the-counter medicines, vitamins, or herbal medicines? no   Have you stopped taking any of your medications? Is so, why? -  no  Are you having any side effects from any of your medications? - no    Have you seen any other physician or provider since your last visit?  no   Have you had any other diagnostic tests since your last visit?  no   Have you been seen in the emergency room and/or had an admission in a hospital since we last saw you?  no   Have you had your routine dental cleaning in the past 6 months?  no     Do you have an active MyChart account? If no, what is the barrier?   No: na    Patient Care Team:  JOSE Andrews CNP as PCP - General (Nurse Practitioner)  JOSE Andrews CNP [facility]  JOSE Andrews CNP as Referring Physician (Nurse Practitioner)  JOSE Andrews CNP as Referring Physician (Nurse Practitioner)    Medical History Review  Past Medical, Family, and Social History reviewed and  contribute to the patient presenting condition    Health Maintenance   Topic Date Due    Varicella vaccine (1 of 2 - 2-dose childhood series) 05/25/1982    Pneumococcal 0-64 years Vaccine (1 of 1 - PPSV23) 05/25/1987    HIV screen  05/25/1996    Potassium monitoring  10/27/2019    Creatinine monitoring  10/27/2019    Flu vaccine (1) 09/01/2020    DTaP/Tdap/Td vaccine (2 - Td) 01/03/2030    Hepatitis A vaccine  Aged Out    Hepatitis B vaccine  Aged Out    Hib vaccine  Aged Out    Meningococcal (ACWY) vaccine  Aged Out

## 2020-10-21 ENCOUNTER — OFFICE VISIT (OUTPATIENT)
Dept: FAMILY MEDICINE CLINIC | Age: 40
End: 2020-10-21
Payer: COMMERCIAL

## 2020-10-21 VITALS
BODY MASS INDEX: 33.86 KG/M2 | TEMPERATURE: 97.9 F | HEART RATE: 89 BPM | OXYGEN SATURATION: 98 % | HEIGHT: 72 IN | WEIGHT: 250 LBS | RESPIRATION RATE: 16 BRPM

## 2020-10-21 PROCEDURE — 99202 OFFICE O/P NEW SF 15 MIN: CPT | Performed by: NURSE PRACTITIONER

## 2020-10-21 RX ORDER — AMOXICILLIN 875 MG/1
875 TABLET, COATED ORAL 2 TIMES DAILY
Qty: 14 TABLET | Refills: 0 | Status: SHIPPED | OUTPATIENT
Start: 2020-10-21 | End: 2020-10-28

## 2020-10-21 RX ORDER — FLUTICASONE PROPIONATE 50 MCG
2 SPRAY, SUSPENSION (ML) NASAL DAILY
Qty: 1 BOTTLE | Refills: 0 | Status: SHIPPED | OUTPATIENT
Start: 2020-10-21

## 2020-10-21 ASSESSMENT — ENCOUNTER SYMPTOMS
NAUSEA: 0
RHINORRHEA: 1
DIARRHEA: 0
ABDOMINAL PAIN: 0
VOMITING: 0
SINUS PAIN: 0
SORE THROAT: 0
COUGH: 0
SHORTNESS OF BREATH: 0
BACK PAIN: 0
EYE PAIN: 0
SINUS PRESSURE: 1

## 2020-10-21 NOTE — PROGRESS NOTES
[address] [facility]  [address] [facility]  [address] [address]  Dept: [phone]  Dept Fax: [phone]    Penny Borrero is a 45 y.o. male who presents to the urgent care today for his medicalconditions/complaints as noted below. Penny Borrero is c/o of Sinusitis (X 2 DAYS)      HPI:     6year-old male patient presents with complaint of left ear pain, congestion and rhinorrhea. Patient reports symptoms began yesterday. Reports nasal congestion, rhinorrhea. Reports left ear pressure. Reports left maxillary sinus fullness, pressure. Denies fevers or chills. Denies chest pain or shortness of breath. Denies vomiting or diarrhea. Denies loss of taste LP denies any known sick contacts. Treatments tried include DayQuil. .      Past Medical History:   Diagnosis Date    Back pain     Depression     Gastroesophageal reflux disease without esophagitis 12/19/2018    Hypertension     Substance abuse (HCC)     tobacco        Current Outpatient Medications   Medication Sig Dispense Refill    amoxicillin (AMOXIL) 875 MG tablet Take 1 tablet by mouth 2 times daily for 7 days 14 tablet 0    fluticasone (FLONASE) 50 MCG/ACT nasal spray 2 sprays by Nasal route daily 1 Bottle 0    famotidine (PEPCID) 40 MG tablet TAKE ONE TABLET BY MOUTH EVERY EVENING 30 tablet 3    lisinopril (PRINIVIL;ZESTRIL) 20 MG tablet Take 1 tab po qd 90 tablet 3     No current facility-administered medications for this visit. Allergies   Allergen Reactions    Naproxen Other (See Comments)     Excessive somnolence  Has had no problem with other nsaids       Subjective:      Review of Systems   Constitutional: Negative for chills and fatigue. HENT: Positive for congestion, ear pain, rhinorrhea and sinus pressure. Negative for sinus pain and sore throat. Eyes: Negative for pain and visual disturbance. Respiratory: Negative for cough and shortness of breath.     Cardiovascular: Negative for chest pain and palpitations. Gastrointestinal: Negative for abdominal pain, diarrhea, nausea and vomiting. Genitourinary: Negative for penile pain and testicular pain. Musculoskeletal: Negative for back pain, joint swelling and neck pain. Skin: Negative for rash. Neurological: Negative for dizziness and light-headedness. Hematological: Does not bruise/bleed easily. All other systems reviewed and are negative. Objective:     Physical Exam  Vitals signs and nursing note reviewed. Constitutional:       Appearance: Normal appearance. HENT:      Right Ear: Tympanic membrane normal.      Left Ear: A middle ear effusion is present. Tympanic membrane is injected. Nose:      Right Sinus: No maxillary sinus tenderness or frontal sinus tenderness. Left Sinus: Maxillary sinus tenderness present. No frontal sinus tenderness. Mouth/Throat:      Pharynx: Oropharynx is clear. Cardiovascular:      Rate and Rhythm: Normal rate. Pulmonary:      Effort: Pulmonary effort is normal. No respiratory distress. Breath sounds: Normal breath sounds. Skin:     General: Skin is warm and dry. Neurological:      General: No focal deficit present. Mental Status: He is alert and oriented to person, place, and time. Pulse 89   Temp 97.9 °F (36.6 °C)   Resp 16   Ht 6' (1.829 m)   Wt 250 lb (113.4 kg)   SpO2 98%   BMI 33.91 kg/m²   Lab Review   No visits with results within 2 Month(s) from this visit.    Latest known visit with results is:   Hospital Outpatient Visit on 10/27/2018   Component Date Value    Cholesterol 10/27/2018 165     HDL 10/27/2018 51     LDL Cholesterol 10/27/2018 104     Chol/HDL Ratio 10/27/2018 3.2     Triglycerides 10/27/2018 49     VLDL 10/27/2018 NOT REPORTED     WBC 10/27/2018 7.1     RBC 10/27/2018 5.02     Hemoglobin 10/27/2018 15.3     Hematocrit 10/27/2018 45.7     MCV 10/27/2018 91.0     MCH 10/27/2018 30.5     MCHC 10/27/2018 33.5     RDW 10/27/2018 12.6     Platelets 05/68/3843 251     MPV 10/27/2018 10.0     NRBC Automated 10/27/2018 0.0     Differential Type 10/27/2018 NOT REPORTED     Seg Neutrophils 10/27/2018 58     Lymphocytes 10/27/2018 29     Monocytes 10/27/2018 9     Eosinophils % 10/27/2018 3     Basophils 10/27/2018 0     Immature Granulocytes 10/27/2018 1*    Segs Absolute 10/27/2018 4.04     Absolute Lymph # 10/27/2018 2.08     Absolute Mono # 10/27/2018 0.66     Absolute Eos # 10/27/2018 0.24     Basophils Absolute 10/27/2018 0.03     Absolute Immature Granul* 10/27/2018 0.04     WBC Morphology 10/27/2018 NOT REPORTED     RBC Morphology 10/27/2018 NOT REPORTED     Platelet Estimate 33/92/8935 NOT REPORTED     Glucose 10/27/2018 109*    BUN 10/27/2018 12     CREATININE 10/27/2018 0.80     Bun/Cre Ratio 10/27/2018 NOT REPORTED     Calcium 10/27/2018 9.3     Sodium 10/27/2018 143     Potassium 10/27/2018 4.6     Chloride 10/27/2018 103     CO2 10/27/2018 27     Anion Gap 10/27/2018 13     Alkaline Phosphatase 10/27/2018 71     ALT 10/27/2018 29     AST 10/27/2018 21     Total Bilirubin 10/27/2018 0.71     Total Protein 10/27/2018 6.8     Alb 10/27/2018 4.4     Albumin/Globulin Ratio 10/27/2018 1.8     GFR Non- 10/27/2018 >60     GFR  10/27/2018 >60     GFR Comment 10/27/2018          GFR Staging 10/27/2018 NOT REPORTED     Testosterone 10/27/2018 353     Sex Hormone Binding 10/27/2018 26     Testosterone, Free 10/27/2018 80.7     Testosterone, Bioavailab* 10/27/2018 189.1        Assessment:       Diagnosis Orders   1. Non-recurrent acute serous otitis media of left ear  amoxicillin (AMOXIL) 875 MG tablet    fluticasone (FLONASE) 50 MCG/ACT nasal spray   2. Sinus pressure  amoxicillin (AMOXIL) 875 MG tablet    fluticasone (FLONASE) 50 MCG/ACT nasal spray       Plan:      Return if symptoms worsen or fail to improve.     Orders Placed This Encounter Medications    amoxicillin (AMOXIL) 875 MG tablet     Sig: Take 1 tablet by mouth 2 times daily for 7 days     Dispense:  14 tablet     Refill:  0    fluticasone (FLONASE) 50 MCG/ACT nasal spray     Si sprays by Nasal route daily     Dispense:  1 Bottle     Refill:  0       Patient instructed to complete antibiotic prescription fully. Discussed flonase dose/ duration. May use Motrin/Tylenol for fever/pain. Warm compresses as desired for ear pain. Patient agreeable to treatment plan. Educational materials provided on AVS.  Follow up if symptoms do not improve. Patient given educational materials - see patient instructions. Discussed use, benefit, and side effects of prescribed medications. All patientquestions answered. Pt voiced understanding. This note was transcribed using dictation with Dragon services. Efforts were made to correct any errors but some words may be misinterpreted.      Electronically signed by JOSE Clark CNP on 10/21/2020at 4:39 PM

## 2020-10-21 NOTE — PATIENT INSTRUCTIONS
Patient Education        Middle Ear Fluid: Care Instructions  Your Care Instructions     Fluid often builds up inside the ear during a cold or allergies. Usually the fluid drains away, but sometimes a small tube in the ear, called the eustachian tube, stays blocked for months. Symptoms of fluid buildup may include:  · Popping, ringing, or a feeling of fullness or pressure in the ear. · Trouble hearing. · Balance problems and dizziness. In most cases, you can treat yourself at home. Follow-up care is a key part of your treatment and safety. Be sure to make and go to all appointments, and call your doctor if you are having problems. It's also a good idea to know your test results and keep a list of the medicines you take. How can you care for yourself at home? · In most cases, the fluid clears up within a few months without treatment. You may need more tests if the fluid does not clear up after 3 months. · If your doctor prescribed antibiotics, take them as directed. Do not stop taking them just because you feel better. You need to take the full course of antibiotics. When should you call for help? Call your doctor now or seek immediate medical care if:    · You have symptoms of infection, such as:  ? Increased pain, swelling, warmth, or redness. ? Pus draining from the area. ? A fever. Watch closely for changes in your health, and be sure to contact your doctor if:    · You notice changes in hearing.     · You do not get better as expected. Where can you learn more? Go to https://Votizen.Mailana. org and sign in to your [employer] account. Enter P431 in the [facility] box to learn more about \"Middle Ear Fluid: Care Instructions. \"     If you do not have an account, please click on the \"Sign Up Now\" link. Current as of: April 15, 2020               Content Version: 12.6  © 7568-2305 [employer]. Care instructions adapted under license by [facility] ([facility]).  If you have questions about a medical condition or this instruction, always ask your healthcare professional. [place] [address] any warranty or liability for your use of this information.

## 2021-02-22 DIAGNOSIS — K21.9 GASTROESOPHAGEAL REFLUX DISEASE WITHOUT ESOPHAGITIS: ICD-10-CM

## 2021-02-22 RX ORDER — FAMOTIDINE 40 MG/1
TABLET, FILM COATED ORAL
Qty: 30 TABLET | Refills: 2 | Status: SHIPPED | OUTPATIENT
Start: 2021-02-22 | Stop reason: SDUPTHER
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
## 2021-04-19 ENCOUNTER — OFFICE VISIT (OUTPATIENT)
Dept: PRIMARY CARE CLINIC | Age: 41
End: 2021-04-19
Payer: COMMERCIAL

## 2021-04-19 VITALS
HEIGHT: 72 IN | SYSTOLIC BLOOD PRESSURE: 129 MMHG | BODY MASS INDEX: 33.86 KG/M2 | OXYGEN SATURATION: 95 % | HEART RATE: 93 BPM | TEMPERATURE: 98.3 F | WEIGHT: 250 LBS | DIASTOLIC BLOOD PRESSURE: 82 MMHG

## 2021-04-19 DIAGNOSIS — H01.005 BLEPHARITIS OF LEFT LOWER EYELID, UNSPECIFIED TYPE: Primary | ICD-10-CM

## 2021-04-19 PROCEDURE — 99213 OFFICE O/P EST LOW 20 MIN: CPT | Performed by: NURSE PRACTITIONER

## 2021-04-19 RX ORDER — AMOXICILLIN AND CLAVULANATE POTASSIUM 875; 125 MG/1; MG/1
1 TABLET, FILM COATED ORAL 2 TIMES DAILY
Qty: 14 TABLET | Refills: 0 | Status: SHIPPED | OUTPATIENT
Start: 2021-04-19 | End: 2021-04-26

## 2021-04-19 RX ORDER — PREDNISONE 20 MG/1
40 TABLET ORAL DAILY
Qty: 10 TABLET | Refills: 0 | Status: SHIPPED | OUTPATIENT
Start: 2021-04-19 | End: 2021-04-24

## 2021-04-19 RX ORDER — OFLOXACIN 3 MG/ML
2 SOLUTION/ DROPS OPHTHALMIC 4 TIMES DAILY
Qty: 10 ML | Refills: 0 | Status: SHIPPED | OUTPATIENT
Start: 2021-04-19 | End: 2021-04-26

## 2021-04-19 ASSESSMENT — ENCOUNTER SYMPTOMS
VOMITING: 0
COUGH: 0
EYE ITCHING: 0
SHORTNESS OF BREATH: 0
EYE REDNESS: 1
FACIAL SWELLING: 1
EYE PAIN: 0
SORE THROAT: 0
RHINORRHEA: 0
EYE DISCHARGE: 1
DIARRHEA: 0

## 2021-04-19 ASSESSMENT — PATIENT HEALTH QUESTIONNAIRE - PHQ9
SUM OF ALL RESPONSES TO PHQ QUESTIONS 1-9: 0
2. FEELING DOWN, DEPRESSED OR HOPELESS: 0

## 2021-04-19 NOTE — PATIENT INSTRUCTIONS
Patient Education        Blepharitis: Care Instructions  Your Care Instructions     Blepharitis is an inflammation or infection of the eyelids. It causes dry, scaly crusts on the eyelids. It can also cause your eyes to itch, burn, and look red. This problem is more common in people who have rosacea, dandruff, skin allergies, or eczema. Home treatment can help you keep your eyes comfortable. Your doctor may also prescribe an ointment to put on your eyelids. Follow-up care is a key part of your treatment and safety. Be sure to make and go to all appointments, and call your doctor if you are having problems. It's also a good idea to know your test results and keep a list of the medicines you take. How can you care for yourself at home? · Wash your eyelids and eyebrows daily with baby shampoo. To wash your eyelids:  ? Place a very warm washcloth over your eyes for about a minute. This will help soften and loosen the crusts on your eyelashes. ? Put a few drops of baby shampoo on a warm washcloth. ? Gently wipe your eyelids. This helps remove any crust. It also cleans your eyelids. ? Rinse well with water. · Be safe with medicines. If your doctor prescribed medicine for you, use it exactly as directed. Call your doctor if you think you are having a problem with your medicine. When should you call for help? Call your doctor now or seek immediate medical care if:    · You have signs of an eye infection, such as:  ? Pus or thick discharge coming from the eye.  ? Redness or swelling around the eye.  ? A fever. Watch closely for changes in your health, and be sure to contact your doctor if:    · You have vision changes.     · You do not get better as expected. Where can you learn more? Go to [email or web address] and sign in to your [employer] account. Enter W161 in the [employer] box to learn more about \"Blepharitis: Care Instructions. \"     If you do not have an account, please click on the \"Sign Up Now\" link. Current as of: August 31, 2020               Content Version: 12.8  © 2006-2021 Healthwise, Incorporated. Care instructions adapted under license by [facility] ([facility]). If you have questions about a medical condition or this instruction, always ask your healthcare professional. [address] any warranty or liability for your use of this information.

## 2021-04-19 NOTE — PROGRESS NOTES
[facility] [id number] [facility] [place] [facility]  [address] [address]  Dept: [phone]  Dept Fax: [phone]    Cale Ivey is a 45 y.o. male who presents to the urgent care today for his medicalconditions/complaints as noted below. Cale Ivey is c/o of Facial Swelling (pt left eye is swelling and is painful X 3 days )      HPI:         41-year-old male patient presents with complaint of left eye swelling. Patient reports that he did yard work over the weekend. Patient reports that the left lower eyelid has since well since mildly tender to palpation. Patient does report that he has had crust in the mornings. Reports watering intermittently throughout the day. Denies light sensitivity. Denies any recent injury or trauma. Tried warm compress, teabag. 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 Denies any painful movements of the eye. Denies vision abnormality. Past Medical History:   Diagnosis Date    Back pain     Depression     Gastroesophageal reflux disease without esophagitis 12/19/2018    Hypertension     Substance abuse (HCC)     tobacco        Current Outpatient Medications   Medication Sig Dispense Refill    ofloxacin (OCUFLOX) 0.3 % solution Place 2 drops into the left eye 4 times daily for 7 days 10 mL 0    amoxicillin-clavulanate (AUGMENTIN) 875-125 MG per tablet Take 1 tablet by mouth 2 times daily for 7 days 14 tablet 0    predniSONE (DELTASONE) 20 MG tablet Take 2 tablets by mouth daily for 5 days 10 tablet 0    famotidine (PEPCID) 40 MG tablet TAKE ONE TABLET BY MOUTH EVERY EVENING 30 tablet 2    fluticasone (FLONASE) 50 MCG/ACT nasal spray 2 sprays by Nasal route daily 1 Bottle 0    lisinopril (PRINIVIL;ZESTRIL) 20 MG tablet Take 1 tab po qd 90 tablet 3     No current facility-administered medications for this visit.       Allergies   Allergen Reactions    Naproxen Other (See Comments)     Excessive somnolence  Has had no problem with other nsaids       Subjective:      Review of Systems   Constitutional: Negative for chills and fever. HENT: Positive for facial swelling. Negative for congestion, rhinorrhea and sore throat. Eyes: Positive for discharge and redness. Negative for pain, itching and visual disturbance. Respiratory: Negative for cough and shortness of breath. Cardiovascular: Negative for chest pain. Gastrointestinal: Negative for diarrhea and vomiting. All other systems reviewed and are negative. Objective:     Physical Exam  Vitals signs and nursing note reviewed. Constitutional:       General: He is not in acute distress. Appearance: Normal appearance. He is not toxic-appearing. HENT:      Right Ear: Tympanic membrane normal.      Left Ear: Tympanic membrane normal.      Mouth/Throat:      Mouth: Mucous membranes are moist.   Eyes:      General:         Left eye: Discharge and hordeolum present. No foreign body. Extraocular Movements: Extraocular movements intact. Conjunctiva/sclera:      Right eye: Right conjunctiva is not injected. No exudate. Left eye: Left conjunctiva is injected. No exudate. Pupils: Pupils are equal, round, and reactive to light. Cardiovascular:      Rate and Rhythm: Normal rate. Pulmonary:      Effort: Pulmonary effort is normal.      Breath sounds: Normal breath sounds. Skin:     General: Skin is warm and dry. Neurological:      General: No focal deficit present. Mental Status: He is alert and oriented to person, place, and time. /82 (Site: Left Upper Arm, Position: Sitting, Cuff Size: Large Adult)   Pulse 93   Temp 98.3 °F (36.8 °C) (Tympanic)   Ht 6' (1.829 m)   Wt 250 lb (113.4 kg)   SpO2 95%   BMI 33.91 kg/m²   Lab Review   No visits with results within 2 Month(s) from this visit.    Latest known visit with results is:   Hospital Outpatient Visit on 10/27/2018 Component Date Value    Cholesterol 10/27/2018 165     HDL 10/27/2018 51     LDL Cholesterol 10/27/2018 104     Chol/HDL Ratio 10/27/2018 3.2     Triglycerides 10/27/2018 49     VLDL 10/27/2018 NOT REPORTED     WBC 10/27/2018 7.1     RBC 10/27/2018 5.02     Hemoglobin 10/27/2018 15.3     Hematocrit 10/27/2018 45.7     MCV 10/27/2018 91.0     MCH 10/27/2018 30.5     MCHC 10/27/2018 33.5     RDW 10/27/2018 12.6     Platelets 85/42/9367 251     MPV 10/27/2018 10.0     NRBC Automated 10/27/2018 0.0     Differential Type 10/27/2018 NOT REPORTED     Seg Neutrophils 10/27/2018 58     Lymphocytes 10/27/2018 29     Monocytes 10/27/2018 9     Eosinophils % 10/27/2018 3     Basophils 10/27/2018 0     Immature Granulocytes 10/27/2018 1*    Segs Absolute 10/27/2018 4.04     Absolute Lymph # 10/27/2018 2.08     Absolute Mono # 10/27/2018 0.66     Absolute Eos # 10/27/2018 0.24     Basophils Absolute 10/27/2018 0.03     Absolute Immature Granul* 10/27/2018 0.04     WBC Morphology 10/27/2018 NOT REPORTED     RBC Morphology 10/27/2018 NOT REPORTED     Platelet Estimate 91/81/0393 NOT REPORTED     Glucose 10/27/2018 109*    BUN 10/27/2018 12     CREATININE 10/27/2018 0.80     Bun/Cre Ratio 10/27/2018 NOT REPORTED     Calcium 10/27/2018 9.3     Sodium 10/27/2018 143     Potassium 10/27/2018 4.6     Chloride 10/27/2018 103     CO2 10/27/2018 27     Anion Gap 10/27/2018 13     Alkaline Phosphatase 10/27/2018 71     ALT 10/27/2018 29     AST 10/27/2018 21     Total Bilirubin 10/27/2018 0.71     Total Protein 10/27/2018 6.8     Albumin 10/27/2018 4.4     Albumin/Globulin Ratio 10/27/2018 1.8     GFR Non- 10/27/2018 >60     GFR  10/27/2018 >60     GFR Comment 10/27/2018          GFR Staging 10/27/2018 NOT REPORTED     Testosterone 10/27/2018 353     Sex Hormone Binding 10/27/2018 26     Testosterone, Free 10/27/2018 80.7     Testosterone,

## 2021-05-03 ENCOUNTER — OFFICE VISIT (OUTPATIENT)
Dept: ORTHOPEDIC SURGERY | Age: 41
End: 2021-05-03
Payer: COMMERCIAL

## 2021-05-03 VITALS — WEIGHT: 260 LBS | BODY MASS INDEX: 35.21 KG/M2 | HEIGHT: 72 IN

## 2021-05-03 DIAGNOSIS — M22.42 CHONDROMALACIA OF BOTH PATELLAE: ICD-10-CM

## 2021-05-03 DIAGNOSIS — M25.562 CHRONIC PAIN OF BOTH KNEES: Primary | ICD-10-CM

## 2021-05-03 DIAGNOSIS — M25.562 PAIN IN BOTH KNEES, UNSPECIFIED CHRONICITY: Primary | ICD-10-CM

## 2021-05-03 DIAGNOSIS — G89.29 CHRONIC PAIN OF BOTH KNEES: Primary | ICD-10-CM

## 2021-05-03 DIAGNOSIS — M25.561 CHRONIC PAIN OF BOTH KNEES: Primary | ICD-10-CM

## 2021-05-03 DIAGNOSIS — M22.41 CHONDROMALACIA OF BOTH PATELLAE: ICD-10-CM

## 2021-05-03 DIAGNOSIS — M25.561 PAIN IN BOTH KNEES, UNSPECIFIED CHRONICITY: Primary | ICD-10-CM

## 2021-05-03 PROCEDURE — 99214 OFFICE O/P EST MOD 30 MIN: CPT | Performed by: ORTHOPAEDIC SURGERY

## 2021-05-03 PROCEDURE — 20610 DRAIN/INJ JOINT/BURSA W/O US: CPT | Performed by: ORTHOPAEDIC SURGERY

## 2021-05-03 RX ORDER — BUPIVACAINE HYDROCHLORIDE 2.5 MG/ML
2 INJECTION, SOLUTION INFILTRATION; PERINEURAL ONCE
Status: SHIPPED | OUTPATIENT
Start: 2021-05-03

## 2021-05-03 RX ORDER — METHYLPREDNISOLONE ACETATE 80 MG/ML
80 INJECTION, SUSPENSION INTRA-ARTICULAR; INTRALESIONAL; INTRAMUSCULAR; SOFT TISSUE ONCE
Status: SHIPPED | OUTPATIENT
Start: 2021-05-03

## 2021-05-03 ASSESSMENT — ENCOUNTER SYMPTOMS
ABDOMINAL PAIN: 0
APNEA: 0
COUGH: 0
CHEST TIGHTNESS: 0
VOMITING: 0

## 2021-05-03 NOTE — PROGRESS NOTES
in our office. Ortho Exam  MS: Full range of motion of bilateral knees. Mildly tender peripatellar area bilateral knees. Otherwise benign exam bilateral knees. No instability of the bilateral knees is appreciated. Negative hip logroll and Stinchfield test is noted bilaterally. Patient ambulates without antalgia or short weightbearing phase to the bilateral lower extremities. Motor, sensory, vascular examination of bilateral lower extremities is grossly intact without focal deficits. Neuro: alert and oriented to person and place. Eyes: Extra-ocular muscles intact  Mouth: Oral mucosa moist. No perioral lesions  Pulm: Respirations unlabored and regular. Symmetric chest excursion without outward deformity is noted. Skin: warm, well perfused  Psych:   Patient has good fund of knowledge and displays understanging of exam, diagnosis, and plan. Radiology:     Xr Knee Left (min 4 Views)    Result Date: 5/3/2021  History:   Left knee pain Findings:   Standing AP/Lateral/Tunnel/Merchant view xrays of the Left done in the office today shows mild  medial joint space narrowing, tricompartmental osteophytosis, joint line sclerosis medially. No evidence of fracture, subluxation, dislocation, radioopaque foreign body/tumor is noted. Lateral subluxation of the tibia is appreciated. Impression:   Left knee mild  degenerative changes as described above. Xr Knee Right (min 4 Views)    Result Date: 5/3/2021  History:   Right knee pain Findings:   Standing AP/Lateral/Tunnel/Merchant view xrays of the Right done in the office today shows mild  medial joint space narrowing, tricompartmental osteophytosis, joint line sclerosis medially. No evidence of fracture, subluxation, dislocation, radioopaque foreign body/tumor is noted. Lateral subluxation of the tibia is appreciated. Impression:   Right knee mild  degenerative changes as described above. KNEE INJECTION PROCEDURE NOTE:  The patient was identified.   The right knee was confirmed with the patient. After a sterile prep with Betadine the knee was injected using a lateral joint line approach with a mixture of 2 mL of 0.25% Marcaine and 80 mg of Depo-Medrol. Patient tolerated the procedure well without post injection complications. I instructed the patient to call our office immediately if they have any swelling or increased pain at the injection site. KNEE INJECTION PROCEDURE NOTE:  The patient was identified. The left knee was confirmed with the patient. After a sterile prep with Betadine the knee was injected using a lateral joint line approach with a mixture of 2 mL of 0.25% Marcaine and 80 mg of Depo-Medrol. Patient tolerated the procedure well without post injection complications. I instructed the patient to call our office immediately if they have any swelling or increased pain at the injection site. Assessment:      1. Chronic pain of both knees    2. Chondromalacia of both patellae       Plan:      Patient would like to proceed with corticosteroid injections to the bilateral knees. This helped immensely previously in 2019 when he last had those. He is going to continue his home exercise program and I plan to back see him back as needed. Follow up:No follow-ups on file. Orders Placed This Encounter   Medications    methylPREDNISolone acetate (DEPO-MEDROL) injection 80 mg    methylPREDNISolone acetate (DEPO-MEDROL) injection 80 mg    bupivacaine (MARCAINE) 0.25 % injection 5 mg    bupivacaine (MARCAINE) 0.25 % injection 5 mg         Orders Placed This Encounter   Procedures    14926 - DRAIN/INJECT LARGE JOINT/BURSA       I have reviewed and made changes accordingly to the work scribed by Angel Fernandez LPN. The documentation accurately reflects work and decisions made by me. I have also reviewed documentation completed by clinical staff.     Jovanni Acevedo DO, [address]  5/4/2021 6:57 AM    This note is created with the assistance of a speech recognition program.  While intending to generate a document that actually reflects the content of the visit, the document can still have some errors including those of syntax and sound a like substitutions which may escape proof reading.  In such instances, actual meaning can be extrapolated by contextual diversion      Electronically signed by Valdo Corcoran on 5/4/2021 at 6:56 AM

## 2021-06-15 DIAGNOSIS — K21.9 GASTROESOPHAGEAL REFLUX DISEASE WITHOUT ESOPHAGITIS: ICD-10-CM

## 2021-06-15 RX ORDER — FAMOTIDINE 40 MG/1
TABLET, FILM COATED ORAL
Qty: 30 TABLET | Refills: 1 | Status: SHIPPED | OUTPATIENT
Start: 2021-06-15 | End: 2021-09-01

## 2021-07-29 ENCOUNTER — OFFICE VISIT (OUTPATIENT)
Dept: PRIMARY CARE CLINIC | Age: 41
End: 2021-07-29
Payer: COMMERCIAL

## 2021-07-29 ENCOUNTER — TELEPHONE (OUTPATIENT)
Dept: FAMILY MEDICINE CLINIC | Age: 41
End: 2021-07-29

## 2021-07-29 VITALS
BODY MASS INDEX: 35.21 KG/M2 | TEMPERATURE: 96.7 F | SYSTOLIC BLOOD PRESSURE: 120 MMHG | HEIGHT: 72 IN | WEIGHT: 260 LBS | OXYGEN SATURATION: 99 % | HEART RATE: 90 BPM | DIASTOLIC BLOOD PRESSURE: 82 MMHG

## 2021-07-29 DIAGNOSIS — L30.9 DERMATITIS: Primary | ICD-10-CM

## 2021-07-29 PROCEDURE — 99213 OFFICE O/P EST LOW 20 MIN: CPT | Performed by: NURSE PRACTITIONER

## 2021-07-29 RX ORDER — CLOBETASOL PROPIONATE 0.5 MG/G
CREAM TOPICAL
Qty: 60 G | Refills: 0 | Status: SHIPPED | OUTPATIENT
Start: 2021-07-29

## 2021-07-29 RX ORDER — PREDNISONE 20 MG/1
40 TABLET ORAL DAILY
Qty: 10 TABLET | Refills: 0 | Status: SHIPPED | OUTPATIENT
Start: 2021-07-29 | End: 2021-08-03

## 2021-07-29 ASSESSMENT — ENCOUNTER SYMPTOMS
COUGH: 0
RESPIRATORY NEGATIVE: 1
SHORTNESS OF BREATH: 0
WHEEZING: 0
CHEST TIGHTNESS: 0

## 2021-07-29 NOTE — PROGRESS NOTES
[facility] [place] [facility]  [address] [facility]  [place] [address]  Dept: [phone]  Dept Fax: [phone]     Jorden Wilkins is a 37 y.o. male who presents to the urgent care today for his medicalconditions/complaints as noted below. Jorden Wilkins is c/o of Rash (pt has rash on his left side X 3 months)    HPI:      Rash  This is a new problem. Episode onset: 3 months ago. The problem is unchanged. Location: left hip/buttock. The rash is characterized by itchiness and redness. It is unknown if there was an exposure to a precipitant. Pertinent negatives include no cough, fatigue, fever or shortness of breath. Treatments tried: various otc treatment. The treatment provided no relief. Past Medical History:   Diagnosis Date    Back pain     Depression     Gastroesophageal reflux disease without esophagitis 12/19/2018    Hypertension     Substance abuse (HCC)     tobacco           Current Outpatient Medications   Medication Sig Dispense Refill    clobetasol (TEMOVATE) 0.05 % cream Apply topically 2 times daily.  60 g 0    predniSONE (DELTASONE) 20 MG tablet Take 2 tablets by mouth daily for 5 days 10 tablet 0    famotidine (PEPCID) 40 MG tablet TAKE ONE TABLET BY MOUTH EVERY EVENING 30 tablet 1    fluticasone (FLONASE) 50 MCG/ACT nasal spray 2 sprays by Nasal route daily 1 Bottle 0    lisinopril (PRINIVIL;ZESTRIL) 20 MG tablet Take 1 tab po qd 90 tablet 3     Current Facility-Administered Medications   Medication Dose Route Frequency Provider Last Rate Last Admin    methylPREDNISolone acetate (DEPO-MEDROL) injection 80 mg  80 mg Intra-articular Once Gilles Mattock, DO        methylPREDNISolone acetate (DEPO-MEDROL) injection 80 mg  80 mg Intra-articular Once Gilles Mattock, DO        bupivacaine (MARCAINE) 0.25 % injection 5 mg  2 mL Intra-articular Once Gilles Mattock, DO        bupivacaine (MARCAINE) 0.25 % injection 5 mg  2 mL Intra-articular Once Delphos Bigg, DO         Allergies   Allergen Reactions    Naproxen Other (See Comments)     Excessive somnolence  Has had no problem with other nsaids       Subjective:      Review of Systems   Constitutional: Negative for chills, fatigue and fever. Respiratory: Negative. Negative for cough, chest tightness, shortness of breath and wheezing. Cardiovascular: Negative. Negative for chest pain. Skin: Positive for rash (left hip/buttock). All other systems reviewed and are negative. Objective:      Physical Exam  Vitals and nursing note reviewed. Constitutional:       General: He is not in acute distress. Appearance: He is well-developed. He is not diaphoretic. HENT:      Head: Normocephalic and atraumatic. Cardiovascular:      Rate and Rhythm: Normal rate and regular rhythm. Heart sounds: Normal heart sounds. No murmur heard. Pulmonary:      Effort: Pulmonary effort is normal. No respiratory distress. Breath sounds: Normal breath sounds. No wheezing. Skin:     General: Skin is warm and dry. Findings: Rash present. Rash is papular (scaling and papules noted in a patch formation to the left hip/buttock area) and scaling. Neurological:      Mental Status: He is alert. /82 (Site: Left Upper Arm, Position: Sitting, Cuff Size: Large Adult)   Pulse 90   Temp 96.7 °F (35.9 °C) (Tympanic)   Ht 6' (1.829 m)   Wt 260 lb (117.9 kg)   SpO2 99%   BMI 35.26 kg/m²     Assessment:       Diagnosis Orders   1. Dermatitis  clobetasol (TEMOVATE) 0.05 % cream    predniSONE (DELTASONE) 20 MG tablet       Plan:      Based on the reported symptoms and the appearance of the rash-- I believe this is dermatitis at this time. Prednisone sent to the pharmacy to help enable symptom relief. .  Clobetasol cream to be applied topically. Patient agrees with treatment plan. Educational materials provided on AVS.  Follow up if symptoms do not improve/worsen.     Orders

## 2021-07-29 NOTE — TELEPHONE ENCOUNTER
----- Message from Eris Yi sent at 7/29/2021 12:01 PM EDT -----  Subject: Appointment Request    Reason for Call: Urgent Skin Problem    QUESTIONS  Type of Appointment? Established Patient  Reason for appointment request? Available appointments did not meet   patient need  Additional Information for Provider? patient has a rash wants to see Octavia Patel gets off at 3 would like to see her at 4 or later if possible wants   a call back to schedule   ---------------------------------------------------------------------------  --------------  CALL BACK INFO  What is the best way for the office to contact you? OK to leave message on   voicemail  Preferred Call Back Phone Number? [phone]  ---------------------------------------------------------------------------  --------------  SCRIPT ANSWERS  Relationship to Patient? Self  Are you having swelling in your throat or face? No  Are you having difficulty breathing? No  Have the symptoms worsened or spread in the last day? No  Are you having fevers (100.4), chills or sweats? No  Have you previously seen a provider for this? No  Have you been diagnosed with, awaiting test results for, or told that you   are suspected of having COVID-19 (Coronavirus)? (If patient has tested   negative or was tested as a requirement for work, school, or travel and   not based on symptoms, answer no)? No  Do you currently have flu-like symptoms including fever or chills, cough,   shortness of breath, difficulty breathing, or new loss of taste or smell? No  Have you had close contact with someone with COVID-19 in the last 14 days? No  (Service Expert  click yes below to proceed with [employer] As Usual   Scheduling)?  Yes

## 2021-09-13 ENCOUNTER — OFFICE VISIT (OUTPATIENT)
Dept: FAMILY MEDICINE CLINIC | Age: 41
End: 2021-09-13
Payer: COMMERCIAL

## 2021-09-13 VITALS
HEART RATE: 95 BPM | DIASTOLIC BLOOD PRESSURE: 70 MMHG | SYSTOLIC BLOOD PRESSURE: 125 MMHG | OXYGEN SATURATION: 97 % | WEIGHT: 260.4 LBS | HEIGHT: 72 IN | TEMPERATURE: 98.5 F | BODY MASS INDEX: 35.27 KG/M2

## 2021-09-13 DIAGNOSIS — Z13.220 LIPID SCREENING: ICD-10-CM

## 2021-09-13 DIAGNOSIS — B35.9 TINEA: ICD-10-CM

## 2021-09-13 DIAGNOSIS — K21.9 GASTROESOPHAGEAL REFLUX DISEASE WITHOUT ESOPHAGITIS: ICD-10-CM

## 2021-09-13 DIAGNOSIS — I10 ESSENTIAL HYPERTENSION: Primary | ICD-10-CM

## 2021-09-13 DIAGNOSIS — Z13.1 DIABETES MELLITUS SCREENING: ICD-10-CM

## 2021-09-13 DIAGNOSIS — Z00.00 ROUTINE GENERAL MEDICAL EXAMINATION AT A HEALTH CARE FACILITY: ICD-10-CM

## 2021-09-13 DIAGNOSIS — Z12.5 SCREENING FOR MALIGNANT NEOPLASM OF PROSTATE: ICD-10-CM

## 2021-09-13 PROCEDURE — 99213 OFFICE O/P EST LOW 20 MIN: CPT | Performed by: NURSE PRACTITIONER

## 2021-09-13 RX ORDER — NYSTATIN 100000 U/G
OINTMENT TOPICAL
Qty: 30 G | Refills: 1 | Status: SHIPPED | OUTPATIENT
Start: 2021-09-13

## 2021-09-13 RX ORDER — LISINOPRIL 20 MG/1
TABLET ORAL
Qty: 30 TABLET | Refills: 11 | Status: SHIPPED | OUTPATIENT
Start: 2021-09-13 | End: 2022-10-12 | Stop reason: SDUPTHER

## 2021-09-13 RX ORDER — FAMOTIDINE 40 MG/1
TABLET, FILM COATED ORAL
Qty: 30 TABLET | Refills: 11 | Status: SHIPPED | OUTPATIENT
Start: 2021-09-13 | End: 2022-10-12

## 2021-09-13 ASSESSMENT — ENCOUNTER SYMPTOMS
COUGH: 0
CHEST TIGHTNESS: 0
NAUSEA: 0
VOMITING: 0
ABDOMINAL PAIN: 0
SHORTNESS OF BREATH: 0

## 2021-09-13 NOTE — PROGRESS NOTES
[address]  [place], [address]  [phone]      Francois Hou is a 37 y.o. male who presents today for his  medicalconditions/complaints as noted below. Francois Hou is c/o of Rash (left buttocks,x4 months,itches,went to  and given cream/pills with no relief) and Hypertension  . HPI:    HPI  Patient here today for hypertension recheck. States he is stable on current medications with no side effects and taking daily. He does not monitor his blood pressure at home. 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 He is due for routine labs and okay for orders. States he has had a large circular red itchy rash on his left hip for the past 4 months. He went to urgent care recently was prescribed a steroid cream and pills with no relief. Discrete denies any new personal care skin products or other family members with a rash. Past Medical History:   Diagnosis Date    Back pain     Depression     Gastroesophageal reflux disease without esophagitis 12/19/2018    Hypertension     Substance abuse ([facility] [address]     tobacco      Past Surgical History:   Procedure Laterality Date    EYE SURGERY Left     metal removed    TOENAIL EXCISION      WISDOM TOOTH EXTRACTION       Family History   Problem Relation Age of Onset    Hypertension Mother    Desiree See Migraines Mother     Arthritis Father     No Known Problems Sister     Hypertension Maternal Grandmother     Hypertension Maternal Grandfather     Diabetes Maternal Grandfather     Arthritis Paternal Grandmother     Stroke Paternal Grandfather     Kidney Disease Paternal Grandfather     Arthritis Paternal Grandfather     Heart Disease Other     Lung Cancer Other      Social History     Tobacco Use    Smoking status: Never Smoker    Smokeless tobacco: Current User     Types: Chew   Substance Use Topics    Alcohol use:  Yes     Alcohol/week: 6.0 standard drinks     Types: 6 Cans of beer per week     Comment: 5 a day      Current Outpatient Medications   Medication Sig Dispense Refill    lisinopril (PRINIVIL;ZESTRIL) 20 MG tablet TAKE ONE TABLET BY MOUTH DAILY 30 tablet 11    famotidine (PEPCID) 40 MG tablet Take 1 tab po QD 30 tablet 11    nystatin (MYCOSTATIN) 584481 UNIT/GM ointment Apply topically 2 times daily. 30 g 1    clobetasol (TEMOVATE) 0.05 % cream Apply topically 2 times daily.  (Patient not taking: Reported on 9/13/2021) 60 g 0    fluticasone (FLONASE) 50 MCG/ACT nasal spray 2 sprays by Nasal route daily (Patient not taking: Reported on 9/13/2021) 1 Bottle 0     Current Facility-Administered Medications   Medication Dose Route Frequency Provider Last Rate Last Admin    methylPREDNISolone acetate (DEPO-MEDROL) injection 80 mg  80 mg Intra-articular Once Wava Lamprey, DO        methylPREDNISolone acetate (DEPO-MEDROL) injection 80 mg  80 mg Intra-articular Once Wava Lamprey, DO        bupivacaine (MARCAINE) 0.25 % injection 5 mg  2 mL Intra-articular Once Wava Lamprey, DO        bupivacaine (MARCAINE) 0.25 % injection 5 mg  2 mL Intra-articular Once Wava Lamprey, DO         Allergies   Allergen Reactions    Naproxen Other (See Comments)     Excessive somnolence  Has had no problem with other nsaids       Health Maintenance   Topic Date Due    Hepatitis C screen  Never done    Varicella vaccine (1 of 2 - 2-dose childhood series) Never done    COVID-19 Vaccine (1) Never done    HIV screen  Never done    Potassium monitoring  10/27/2019    Creatinine monitoring  10/27/2019    Diabetes screen  Never done    Flu vaccine (1) 09/13/2022 (Originally 9/1/2021)    Lipid screen  10/27/2023    DTaP/Tdap/Td vaccine (2 - Td or Tdap) 01/03/2030    Hepatitis A vaccine  Aged Out    Hepatitis B vaccine  Aged Out    Hib vaccine  Aged Out    Meningococcal (ACWY) vaccine  Aged Out    Pneumococcal 0-64 years Vaccine  Aged Out       Subjective: Review of Systems   Constitutional: Negative for appetite change, chills, diaphoresis, fatigue and fever. Eyes: Negative for visual disturbance. Respiratory: Negative for cough, chest tightness and shortness of breath. Cardiovascular: Negative for chest pain, palpitations and leg swelling. Gastrointestinal: Negative for abdominal pain, nausea and vomiting. Genitourinary: Negative for difficulty urinating. Skin: Positive for rash. Neurological: Negative for dizziness, weakness and headaches. Hematological: Negative for adenopathy. Psychiatric/Behavioral: Negative for dysphoric mood and sleep disturbance. The patient is not nervous/anxious. Objective:      Physical Exam  Vitals and nursing note reviewed. Constitutional:       General: He is not in acute distress. Appearance: Normal appearance. He is well-developed. He is obese. He is not ill-appearing or diaphoretic. HENT:      Head: Normocephalic and atraumatic. Eyes:      Conjunctiva/sclera: Conjunctivae normal.   Cardiovascular:      Rate and Rhythm: Normal rate and regular rhythm. Heart sounds: Normal heart sounds. Comments: No LE edema  Pulmonary:      Effort: Pulmonary effort is normal.      Breath sounds: Normal breath sounds. Musculoskeletal:      Cervical back: Neck supple. Skin:     General: Skin is warm and dry. Findings: Rash (large circular rash to left hip/buttocks with irregular borders, appears fungal) present. Neurological:      General: No focal deficit present. Mental Status: He is alert and oriented to person, place, and time. Mental status is at baseline. Psychiatric:         Mood and Affect: Mood normal.         Behavior: Behavior normal.         Thought Content: Thought content normal.         Judgment: Judgment normal.         Assessment:       Diagnosis Orders   1. Essential hypertension  lisinopril (PRINIVIL;ZESTRIL) 20 MG tablet   2. Tinea     3.  Routine general medical examination at a health care facility  Lipid Panel    CBC With Auto Differential    Comprehensive Metabolic Panel    Hemoglobin A1C    PSA Screening   4. Gastroesophageal reflux disease without esophagitis  famotidine (PEPCID) 40 MG tablet   5. Lipid screening  Lipid Panel   6. Diabetes mellitus screening  Hemoglobin A1C   7. Screening for malignant neoplasm of prostate  PSA Screening     Wt Readings from Last 3 Encounters:   09/13/21 260 lb 6.4 oz (118.1 kg)   07/29/21 260 lb (117.9 kg)   05/03/21 260 lb (117.9 kg)     BP Readings from Last 3 Encounters:   09/13/21 125/70   07/29/21 120/82   04/19/21 129/82         Plan:      Return in about 1 year (around 9/13/2022) for routine healthcare visit, return for bp check/HTN. Orders Placed This Encounter   Procedures    Lipid Panel     Standing Status:   Future     Standing Expiration Date:   9/13/2022     Order Specific Question:   Is Patient Fasting?/# of Hours     Answer:   8 hrs    CBC With Auto Differential     Standing Status:   Future     Standing Expiration Date:   9/13/2022    Comprehensive Metabolic Panel     Standing Status:   Future     Standing Expiration Date:   9/13/2022    Hemoglobin A1C     Standing Status:   Future     Standing Expiration Date:   9/13/2022    PSA Screening     Standing Status:   Future     Standing Expiration Date:   9/13/2022     Orders Placed This Encounter   Medications    lisinopril (PRINIVIL;ZESTRIL) 20 MG tablet     Sig: TAKE ONE TABLET BY MOUTH DAILY     Dispense:  30 tablet     Refill:  11    famotidine (PEPCID) 40 MG tablet     Sig: Take 1 tab po QD     Dispense:  30 tablet     Refill:  11    nystatin (MYCOSTATIN) 310289 UNIT/GM ointment     Sig: Apply topically 2 times daily. Dispense:  30 g     Refill:  1     Hypertension well controlled refills given  Update routine labs  Will call with test results  D NEYMAR diet along with low-fat foods and regular exercise per week    Rash:   We will trial nystatin topically twice daily x10 days   Oral and topical steroids were not helpful   If does not resolve will consider punch biopsy patient to call for an appointment if needed     GERD stable refill given     patient given educational materials - see patient instructions. Discussed use,benefit, and side effects of prescribed medications. All patient questions answered. Pt voiced understanding. Reviewed health maintenance. Instructed to continue currentmedications, diet and exercise.     Electronically signed by JOSE Casper CNP, CNP on 9/14/2021 at 8:10 AM

## 2021-09-13 NOTE — PROGRESS NOTES
Visit Information    Have you changed or started any medications since your last visit including any over-the-counter medicines, vitamins, or herbal medicines? no   Have you stopped taking any of your medications? Is so, why? -  no  Are you having any side effects from any of your medications? - no    Have you seen any other physician or provider since your last visit?  no   Have you had any other diagnostic tests since your last visit?  no   Have you been seen in the emergency room and/or had an admission in a hospital since we last saw you?  no   Have you had your routine dental cleaning in the past 6 months?  no     Do you have an active MyChart account? If no, what is the barrier?   No: na    Patient Care Team:  JOSE Mariano Ala, CNP as PCP - General (Nurse Practitioner)  JOSE Mariano Ala, CNP as PCP - [facility]  JOSE Mariano Ala, CNP as Referring Physician (Nurse Practitioner)  JOSE Mariano Ala, CNP as Referring Physician (Nurse Practitioner)    Medical History Review  Past Medical, Family, and Social History reviewed and  contribute to the patient presenting condition    Health Maintenance   Topic Date Due    Hepatitis C screen  Never done    Varicella vaccine (1 of 2 - 2-dose childhood series) Never done    COVID-19 Vaccine (1) Never done    HIV screen  Never done    Potassium monitoring  10/27/2019    Creatinine monitoring  10/27/2019    Diabetes screen  Never done    Flu vaccine (1) 09/01/2021    Lipid screen  10/27/2023    DTaP/Tdap/Td vaccine (2 - Td or Tdap) 01/03/2030    Hepatitis A vaccine  Aged Out    Hepatitis B vaccine  Aged Out    Hib vaccine  Aged Out    Meningococcal (ACWY) vaccine  Aged Out    Pneumococcal 0-64 years Vaccine  Aged Out

## 2021-10-04 ENCOUNTER — OFFICE VISIT (OUTPATIENT)
Dept: ORTHOPEDIC SURGERY | Age: 41
End: 2021-10-04
Payer: COMMERCIAL

## 2021-10-04 VITALS — WEIGHT: 258 LBS | BODY MASS INDEX: 34.95 KG/M2 | HEIGHT: 72 IN

## 2021-10-04 DIAGNOSIS — M22.41 CHONDROMALACIA PATELLAE, RIGHT KNEE: Primary | ICD-10-CM

## 2021-10-04 PROCEDURE — 20610 DRAIN/INJ JOINT/BURSA W/O US: CPT | Performed by: ORTHOPAEDIC SURGERY

## 2021-10-04 PROCEDURE — 99213 OFFICE O/P EST LOW 20 MIN: CPT | Performed by: ORTHOPAEDIC SURGERY

## 2021-10-04 RX ORDER — METHYLPREDNISOLONE ACETATE 80 MG/ML
80 INJECTION, SUSPENSION INTRA-ARTICULAR; INTRALESIONAL; INTRAMUSCULAR; SOFT TISSUE ONCE
Status: COMPLETED | OUTPATIENT
Start: 2021-10-04 | End: 2021-10-04

## 2021-10-04 RX ORDER — BUPIVACAINE HYDROCHLORIDE 2.5 MG/ML
2 INJECTION, SOLUTION INFILTRATION; PERINEURAL ONCE
Status: COMPLETED | OUTPATIENT
Start: 2021-10-04 | End: 2021-10-04

## 2021-10-04 RX ADMIN — BUPIVACAINE HYDROCHLORIDE 5 MG: 2.5 INJECTION, SOLUTION INFILTRATION; PERINEURAL at 12:06

## 2021-10-04 RX ADMIN — METHYLPREDNISOLONE ACETATE 80 MG: 80 INJECTION, SUSPENSION INTRA-ARTICULAR; INTRALESIONAL; INTRAMUSCULAR; SOFT TISSUE at 12:06

## 2021-10-04 ASSESSMENT — ENCOUNTER SYMPTOMS
COUGH: 0
NAUSEA: 0
DIARRHEA: 0
CONSTIPATION: 0

## 2021-10-04 NOTE — PROGRESS NOTES
[facility]  [address] [facility] [address] [facility]  Dept: [phone]  Dept Fax: [phone]        Ambulatory Follow Up      Subjective:   Ashley Ortega is a 37y.o. year old male who presents to our office today for routine followup regarding his   1. Chondromalacia patellae, right knee    . Chief Complaint   Patient presents with    Knee Pain     right       HPI- Patient is here today for follow up for right knee pain. Patient was last seen on 5/3/2021 for bilateral chondromalcia patella and received corticosteroid injections. Patient has relief with the injections. Patient denies any pain or enticing events since last visit. Patient says he feels like a nail is stabbing him on the anterolateral portion of his right knee. Patient says the pain is worse with kneeling. Bilateral knee corticosteroid injections-3/28/19, 5/3/21    Review of Systems   Constitutional: Negative for chills and fever. Respiratory: Negative for cough. Gastrointestinal: Negative for constipation, diarrhea and nausea. Musculoskeletal: Positive for arthralgias (right knee). Negative for gait problem, joint swelling and myalgias. Neurological: Negative for dizziness, weakness and numbness. I have reviewed the CC, HPI, ROS, PMH, FHX, Social History, and if not present in this note, I have reviewed in the patient's chart. I agree with the documentation provided by other staff and have reviewed their documentation prior to providing my signature indicating agreement. Objective :   Ht 6' (1.829 m)   Wt 258 lb (117 kg)   BMI 34.99 kg/m²  Body mass index is 34.99 kg/m². General: Ashley Ortega is a 37 y.o. male who is alert and oriented and sitting comfortably in our office. Ortho Exam  MS:  Mild crepitance bilateral knees. Negative meniscal signs on the right. Patient displays a mild shortened weightbearing phase to the Right lower extremity.   Mild knee effusion is noted to (DEPO-MEDROL) injection 80 mg    bupivacaine (MARCAINE) 0.25 % injection 5 mg         Orders Placed This Encounter   Procedures    MN ARTHROCENTESIS ASPIR&/INJ MAJOR JT/BURSA W/O US     Abhilash Nicolas RN am scribing for and in the presence of Dr. Jay Chambers  10/5/2021 8:29 AM      I have reviewed and made changes accordingly to the work scribed by Jean-Paul Casey RN. The documentation accurately reflects work and decisions made by me. I have also reviewed documentation completed by clinical staff.     Jay Chambers DO, [address] [facility]  10/5/2021 8:30 AM    This note is created with the assistance of a speech recognition program.  While intending to generate a document that actually reflects the content of the visit, the document can still have some errors including those of syntax and sound a like substitutions which may escape proof reading.  In such instances, actual meaning can be extrapolated by contextual diversion      Electronically signed by Alie Madrid DO, FAOAO on 10/5/2021 at 8:29 AM

## 2021-12-16 ENCOUNTER — OFFICE VISIT (OUTPATIENT)
Dept: PRIMARY CARE CLINIC | Age: 41
End: 2021-12-16
Payer: COMMERCIAL

## 2021-12-16 VITALS
DIASTOLIC BLOOD PRESSURE: 86 MMHG | OXYGEN SATURATION: 97 % | SYSTOLIC BLOOD PRESSURE: 127 MMHG | TEMPERATURE: 98.6 F | HEART RATE: 99 BPM | WEIGHT: 258 LBS | HEIGHT: 72 IN | BODY MASS INDEX: 34.95 KG/M2

## 2021-12-16 DIAGNOSIS — M10.9 ACUTE GOUT INVOLVING TOE OF RIGHT FOOT, UNSPECIFIED CAUSE: Primary | ICD-10-CM

## 2021-12-16 PROCEDURE — 99213 OFFICE O/P EST LOW 20 MIN: CPT | Performed by: NURSE PRACTITIONER

## 2021-12-16 RX ORDER — PREDNISONE 20 MG/1
40 TABLET ORAL DAILY
Qty: 10 TABLET | Refills: 0 | Status: SHIPPED | OUTPATIENT
Start: 2021-12-16 | End: 2021-12-21

## 2021-12-16 ASSESSMENT — ENCOUNTER SYMPTOMS
COUGH: 0
WHEEZING: 0
RESPIRATORY NEGATIVE: 1
CHEST TIGHTNESS: 0
SHORTNESS OF BREATH: 0

## 2021-12-16 NOTE — PROGRESS NOTES
[facility] [id number] [facility] [place] [facility]  [address] [address]  Dept: [phone]  Dept Fax: [phone]     Kane Viera is a 37 y.o. male who presents to the urgent care today for his medicalconditions/complaints as noted below. Kane Viera is c/o of Gout (rt big toe)    HPI:      Toe Pain   The incident occurred 2 days ago. There was no injury mechanism. Pain location: right great toe. The pain is moderate. Pertinent negatives include no inability to bear weight, numbness or tingling. Exacerbated by: otc tx. He has tried nothing for the symptoms. The treatment provided no relief. Past Medical History:   Diagnosis Date    Back pain     Depression     Gastroesophageal reflux disease without esophagitis 12/19/2018    Hypertension     Substance abuse (HCC)     tobacco      Current Outpatient Medications   Medication Sig Dispense Refill    predniSONE (DELTASONE) 20 MG tablet Take 2 tablets by mouth daily for 5 days 10 tablet 0    lisinopril (PRINIVIL;ZESTRIL) 20 MG tablet TAKE ONE TABLET BY MOUTH DAILY 30 tablet 11    famotidine (PEPCID) 40 MG tablet Take 1 tab po QD 30 tablet 11    nystatin (MYCOSTATIN) 428555 UNIT/GM ointment Apply topically 2 times daily. 30 g 1    clobetasol (TEMOVATE) 0.05 % cream Apply topically 2 times daily.  (Patient not taking: Reported on 9/13/2021) 60 g 0    fluticasone (FLONASE) 50 MCG/ACT nasal spray 2 sprays by Nasal route daily (Patient not taking: Reported on 9/13/2021) 1 Bottle 0     Current Facility-Administered Medications   Medication Dose Route Frequency Provider Last Rate Last Admin    methylPREDNISolone acetate (DEPO-MEDROL) injection 80 mg  80 mg Intra-artICUlar Once Classie Dago, DO        methylPREDNISolone acetate (DEPO-MEDROL) injection 80 mg  80 mg Intra-artICUlar Once Classie Dago, DO        bupivacaine (MARCAINE) 0.25 % injection 5 mg  2 mL Intra-artICUlar Once Classie Dago, DO  bupivacaine (MARCAINE) 0.25 % injection 5 mg  2 mL Intra-artICUlar Once Jennifer Doty,          Allergies   Allergen Reactions    Naproxen Other (See Comments)     Excessive somnolence  Has had no problem with other nsaids     Reviewed PMH, SH, and FH with the patient and updated. Subjective:      Review of Systems   Constitutional: Negative for chills, fatigue and fever. Respiratory: Negative. Negative for cough, chest tightness, shortness of breath and wheezing. Cardiovascular: Negative. Negative for chest pain. Musculoskeletal: Positive for arthralgias (right great toe), gait problem (due to right great toe pain/swelling) and joint swelling (right great toe). Skin: Negative for rash. Neurological: Negative for tingling and numbness. All other systems reviewed and are negative. Objective:      Physical Exam  Vitals and nursing note reviewed. Constitutional:       General: He is not in acute distress. Appearance: He is well-developed. He is not diaphoretic. HENT:      Head: Normocephalic and atraumatic. Cardiovascular:      Rate and Rhythm: Normal rate and regular rhythm. Heart sounds: Normal heart sounds. No murmur heard. Pulmonary:      Effort: Pulmonary effort is normal. No respiratory distress. Breath sounds: Normal breath sounds. No wheezing. Musculoskeletal:      Right foot: Swelling (right great toe) and tenderness (right great toe) present. Skin:     General: Skin is warm and dry. Findings: Erythema (mild, right great toe) present. Neurological:      Mental Status: He is alert. /86 (Site: Left Upper Arm, Position: Sitting, Cuff Size: Large Adult)   Pulse 99   Temp 98.6 °F (37 °C) (Tympanic)   Ht 6' (1.829 m)   Wt 258 lb (117 kg)   SpO2 97%   BMI 34.99 kg/m²     Assessment:       Diagnosis Orders   1.  Acute gout involving toe of right foot, unspecified cause  predniSONE (DELTASONE) 20 MG tablet     Plan:      Based on the physical exam findings-- I believe this is likely a gout attack. Prednisone sent to the pharmacy to help enable symptom relief. Patient is agreeable to treatment plan. Educational materials provided on AVS.  Follow up if symptoms do not improve/worsen. Orders Placed This Encounter   Medications    predniSONE (DELTASONE) 20 MG tablet     Sig: Take 2 tablets by mouth daily for 5 days     Dispense:  10 tablet     Refill:  0        Patient given educational materials - see patient instructions. Discussed use, benefit, and side effects of prescribed medications. All patientquestions answered. Pt voiced understanding.     Electronically signed by JOSE Crain CNP on 12/16/2021at 4:25 PM

## 2021-12-16 NOTE — PATIENT INSTRUCTIONS
Patient Education        Gout: Care Instructions  Overview     Gout is a form of arthritis caused by a buildup of uric acid crystals in a joint. It causes sudden attacks of pain, swelling, redness, and stiffness, usually in one joint, especially the big toe. Gout usually comes on without a cause. But it can be brought on by drinking alcohol (especially beer), eating or drinking things made with high-fructose corn syrup, or eating seafood or red meat. Taking certain medicines, such as diuretics, can also trigger an attack of gout. Taking your medicines as prescribed and following up with your doctor regularly can help you avoid gout attacks in the future. Follow-up care is a key part of your treatment and safety. Be sure to make and go to all appointments, and call your doctor if you are having problems. It's also a good idea to know your test results and keep a list of the medicines you take. How can you care for yourself at home? · If the joint is swollen, put ice or a cold pack on the area for 10 to 20 minutes at a time. Put a thin cloth between the ice and your skin. · Prop up the sore limb on a pillow when you ice it or anytime you sit or lie down during the next 3 days. Try to keep it above the level of your heart. This can help reduce swelling. · Rest sore joints. Avoid activities that put weight or strain on the joints for a few days. Take short rest breaks from your regular activities during the day. · Take your medicines exactly as prescribed. Call your doctor if you think you are having a problem with your medicine. · Take pain medicines exactly as directed. ? If the doctor gave you a prescription medicine for pain, take it as prescribed. ? If you are not taking a prescription pain medicine, ask your doctor if you can take an over-the-counter medicine. · Eat less seafood and red meat. · Avoid foods or drinks that are made with high-fructose corn syrup.   · Check with your doctor before drinking alcohol. · Losing weight, if you are overweight, may help reduce attacks of gout. But do not go on a diet that causes rapid weight loss. Losing a lot of weight in a short amount of time can cause a gout attack. When should you call for help? Call your doctor now or seek immediate medical care if:    · You have a fever.     · The joint is so painful you cannot use it.     · You have sudden, unexplained swelling, redness, warmth, or severe pain in one or more joints. Watch closely for changes in your health, and be sure to contact your doctor if:    · You have joint pain.     · Your symptoms get worse or are not improving after 2 or 3 days. Where can you learn more? Go to https://Cervilenz.[employer]. org and sign in to your [employer] account. Enter E547 in the [employer] box to learn more about \"Gout: Care Instructions. \"     If you do not have an account, please click on the \"Sign Up Now\" link. Current as of: April 30, 2021               Content Version: 13.0  © 2006-2021 [employer]. Care instructions adapted under license by [facility] ([facility]). If you have questions about a medical condition or this instruction, always ask your healthcare professional. [address] any warranty or liability for your use of this information. Patient Education        Purine-Restricted Diet: Care Instructions  Your Care Instructions     Purines are substances that are found in some foods. Your body turns purines into uric acid. High levels of uric acid can cause gout, which is a form of arthritis that causes pain and inflammation in joints. You may be able to help control the amount of uric acid in your body by limiting high-purine foods in your diet. Follow-up care is a key part of your treatment and safety. Be sure to make and go to all appointments, and call your doctor if you are having problems.  It's also a good idea to know your test results and keep a list of the medicines you take. How can you care for yourself at home? · Plan your meals and snacks around foods that are low in purines and are safe for you to eat. These foods include:  ? Green vegetables and tomatoes. ? Fruits. ? Whole-grain breads, rice, and cereals. ? Eggs, peanut butter, and nuts. ? Low-fat milk, cheese, and other milk products. ? Popcorn. ? Gelatin desserts, chocolate, cocoa, and cakes and sweets, in small amounts. · You can eat certain foods that are medium-high in purines, but eat them only once in a while. These foods include:  ? Legumes, such as dried beans and dried peas. You can have 1 cup cooked legumes each day. ? Asparagus, cauliflower, spinach, mushrooms, and green peas. ? Fish and seafood (other than very high-purine seafood). ? Oatmeal, wheat bran, and wheat germ. · Limit very high-purine foods, including:  ? Organ meats, such as liver, kidneys, sweetbreads, and brains. ? Meats, including paz, beef, pork, and lamb. ? Game meats and any other meats in large amounts. ? Anchovies, sardines, herring, mackerel, and scallops. ? Gravy. ? Beer. Where can you learn more? Go to https://sfilatinopepiceweb.Rachio. org and sign in to your [employer] account. Enter F448 in the [employer] box to learn more about \"Purine-Restricted Diet: Care Instructions. \"     If you do not have an account, please click on the \"Sign Up Now\" link. Current as of: December 17, 2020               Content Version: 13.0  © 5481-6928 [employer]. Care instructions adapted under license by [facility]. If you have questions about a medical condition or this instruction, always ask your healthcare professional. Ferny Villagomez any warranty or liability for your use of this information.

## 2022-01-16 ENCOUNTER — OFFICE VISIT (OUTPATIENT)
Dept: FAMILY MEDICINE CLINIC | Age: 42
End: 2022-01-16
Payer: COMMERCIAL

## 2022-01-16 ENCOUNTER — HOSPITAL ENCOUNTER (OUTPATIENT)
Age: 42
Setting detail: SPECIMEN
Discharge: HOME | End: 2022-01-16

## 2022-01-16 VITALS
SYSTOLIC BLOOD PRESSURE: 132 MMHG | OXYGEN SATURATION: 98 % | TEMPERATURE: 98.8 F | HEART RATE: 86 BPM | RESPIRATION RATE: 17 BRPM | DIASTOLIC BLOOD PRESSURE: 82 MMHG

## 2022-01-16 DIAGNOSIS — Z76.89 RETURN TO WORK EVALUATION: Primary | ICD-10-CM

## 2022-01-16 DIAGNOSIS — Z76.89 RETURN TO WORK EVALUATION: ICD-10-CM

## 2022-01-16 PROCEDURE — 99212 OFFICE O/P EST SF 10 MIN: CPT | Performed by: FAMILY MEDICINE

## 2022-01-16 ASSESSMENT — ENCOUNTER SYMPTOMS
VOICE CHANGE: 0
SORE THROAT: 0
DIARRHEA: 0
NAUSEA: 0
TROUBLE SWALLOWING: 0
VOMITING: 0
SHORTNESS OF BREATH: 0
WHEEZING: 0
COUGH: 0

## 2022-01-16 NOTE — PROGRESS NOTES
Edouard Friend MD  [facility]  [address] [address]  Dept: [phone]    Erich Coley is a 37 y.o. male who presents today for hismedical conditions/complaints as noted below.   Erich Coley is here today c/o Other (follow up Covid test)       HPI:     HPI    Patient presents to the walk-in clinic for COVID testing  8 days ago he and his wife tested positive for COVID with a home test, symptoms are now completely resolved, he initially had 2 days of fever and 1 day of loss of taste and no symptoms subsequently, denies dyspnea, wheezing, GI symptoms, rhinorrhea, sore throat  Works as an , as per work policy he needs a negative COVID test in order to return to work after 10 days of isolation  Not vaccinated against [place]  He is feeling very well and is ready to return to work      Patient Active Problem List   Diagnosis    Hypertension    Depression    Substance abuse ([address]    Gastroesophageal reflux disease without esophagitis       Past Medical History:   Diagnosis Date    Back pain     Depression     Gastroesophageal reflux disease without esophagitis 12/19/2018    Hypertension     Substance abuse ([address]     tobacco     Past Surgical History:   Procedure Laterality Date    EYE SURGERY Left     metal removed    TOENAIL EXCISION      WISDOM TOOTH EXTRACTION       Family History   Problem Relation Age of Onset    Hypertension Mother    Eladio Porch Migraines Mother     Arthritis Father     No Known Problems Sister     Hypertension Maternal Grandmother     Hypertension Maternal Grandfather     Diabetes Maternal Grandfather     Arthritis Paternal Grandmother     Stroke Paternal Grandfather     Kidney Disease Paternal Grandfather     Arthritis Paternal Grandfather     Heart Disease Other     Lung Cancer Other      Social History     Tobacco Use    Smoking status: Never Smoker  Smokeless tobacco: Current User     Types: Chew   Vaping Use    Vaping Use: Never used   Substance Use Topics    Alcohol use: Yes     Alcohol/week: 6.0 standard drinks     Types: 6 Cans of beer per week     Comment: 5 a day    Drug use: No       Current Outpatient Medications:     lisinopril (PRINIVIL;ZESTRIL) 20 MG tablet, TAKE ONE TABLET BY MOUTH DAILY, Disp: 30 tablet, Rfl: 11    famotidine (PEPCID) 40 MG tablet, Take 1 tab po QD, Disp: 30 tablet, Rfl: 11    nystatin (MYCOSTATIN) 267611 UNIT/GM ointment, Apply topically 2 times daily. , Disp: 30 g, Rfl: 1    clobetasol (TEMOVATE) 0.05 % cream, Apply topically 2 times daily. (Patient not taking: Reported on 9/13/2021), Disp: 60 g, Rfl: 0    fluticasone (FLONASE) 50 MCG/ACT nasal spray, 2 sprays by Nasal route daily (Patient not taking: Reported on 9/13/2021), Disp: 1 Bottle, Rfl: 0    Subjective:     Review of Systems   Constitutional: Negative for fever. HENT: Negative for sore throat, trouble swallowing and voice change. Respiratory: Negative for cough, shortness of breath and wheezing. Cardiovascular: Negative for chest pain. Gastrointestinal: Negative for diarrhea, nausea and vomiting. Objective:     /82 (Site: Right Upper Arm, Position: Sitting, Cuff Size: Large Adult)   Pulse 86   Temp 98.8 °F (37.1 °C)   Resp 17   SpO2 98%     Physical Exam  Constitutional:       General: He is not in acute distress. Appearance: Normal appearance. He is well-developed. He is not diaphoretic. Eyes:      General:         Right eye: No discharge. Left eye: No discharge. Conjunctiva/sclera: Conjunctivae normal.   Cardiovascular:      Rate and Rhythm: Normal rate and regular rhythm. Heart sounds: Normal heart sounds. No murmur heard. Pulmonary:      Effort: Pulmonary effort is normal. No respiratory distress. Breath sounds: Normal breath sounds. No wheezing. Abdominal:      Palpations: Abdomen is soft. Tenderness: There is no abdominal tenderness. Lymphadenopathy:      Cervical: No cervical adenopathy. Neurological:      Mental Status: He is alert. Psychiatric:         Behavior: Behavior normal.         Thought Content: Thought content normal.         Judgment: Judgment normal.         Assessment & Plan:      1. Return to work evaluation  COVID test performed as per patient's work requirements. Given that he has no symptoms and has completed his isolation period, he is cleared to return to work. - COVID-19; Future    Call or return to clinic prn if these symptoms worsen or fail to improve as anticipated. I have reviewed the instructions with the patient, answering all questions to their satisfaction.     Electronically signed by Kita Castellano MD on 1/16/2022 at 11:40 AM

## 2022-01-17 LAB
SARS-COV-2: ABNORMAL
SARS-COV-2: DETECTED
SOURCE: ABNORMAL

## 2022-02-07 NOTE — PATIENT INSTRUCTIONS
75mg ketamine given   Patient Education        Dermatitis: Care Instructions  Your Care Instructions  Dermatitis is the general name used for any rash or inflammation of the skin. Different kinds of dermatitis cause different kinds of rashes. Common causes of a rash include new medicines, plants (such as poison oak or poison ivy), heat, and stress. Certain illnesses can also cause a rash. An allergic reaction to something that touches your skin, such as latex, nickel, or poison ivy, is called contact dermatitis. Contact dermatitis may also be caused by something that irritates the skin, such as bleach, a chemical, or soap. These types of rashes cannot be spread from person to person. How long your rash will last depends on what caused it. Rashes may last a few days or months. Follow-up care is a key part of your treatment and safety. Be sure to make and go to all appointments, and call your doctor if you are having problems. It's also a good idea to know your test results and keep a list of the medicines you take. How can you care for yourself at home? · Do not scratch the rash. Cut your nails short, and file them smooth. Or wear gloves if this helps keep you from scratching. · Wash the area with water only. Pat dry. · Put cold, wet cloths on the rash to reduce itching. · Keep cool, and stay out of the sun. · Leave the rash open to the air as much as possible. · If the rash itches, use hydrocortisone cream. Follow the directions on the label. Calamine lotion may help for plant rashes. · Take an over-the-counter antihistamine, such as diphenhydramine (Benadryl) or loratadine (Claritin), to help calm the itching. Read and follow all instructions on the label. · If your doctor prescribed a cream, use it as directed. If your doctor prescribed medicine, take it exactly as directed. When should you call for help?    Call your doctor now or seek immediate medical care if:    · You have symptoms of infection, such as:  ? Increased pain, swelling, warmth, or redness. ? Red streaks leading from the area. ? Pus draining from the area. ? A fever.     · You have joint pain along with the rash. Watch closely for changes in your health, and be sure to contact your doctor if:    · Your rash is changing or getting worse.     · You are not getting better as expected. Where can you learn more? Go to https://Corindus.Fluidnet. org and sign in to your Kar[employer] account. Enter [phone] in the [facility] box to learn more about \"Dermatitis: Care Instructions. \"     If you do not have an account, please click on the \"Sign Up Now\" link. Current as of: March 3, 2021               Content Version: 12.9  © 0497-5705 Healthwise, Incorporated. Care instructions adapted under license by [facility] ([facility]). If you have questions about a medical condition or this instruction, always ask your healthcare professional. [place] [address] any warranty or liability for your use of this information.

## 2022-09-14 ENCOUNTER — OFFICE VISIT (OUTPATIENT)
Dept: PRIMARY CARE CLINIC | Age: 42
End: 2022-09-14
Payer: COMMERCIAL

## 2022-09-14 VITALS — SYSTOLIC BLOOD PRESSURE: 112 MMHG | HEART RATE: 94 BPM | OXYGEN SATURATION: 100 % | DIASTOLIC BLOOD PRESSURE: 64 MMHG

## 2022-09-14 DIAGNOSIS — L28.2 PRURITIC RASH: ICD-10-CM

## 2022-09-14 DIAGNOSIS — T63.441A BEE STING, ACCIDENTAL OR UNINTENTIONAL, INITIAL ENCOUNTER: Primary | ICD-10-CM

## 2022-09-14 PROCEDURE — 99213 OFFICE O/P EST LOW 20 MIN: CPT | Performed by: FAMILY MEDICINE

## 2022-09-14 PROCEDURE — 96372 THER/PROPH/DIAG INJ SC/IM: CPT | Performed by: FAMILY MEDICINE

## 2022-09-14 RX ORDER — TRIAMCINOLONE ACETONIDE 40 MG/ML
40 INJECTION, SUSPENSION INTRA-ARTICULAR; INTRAMUSCULAR ONCE
Status: COMPLETED | OUTPATIENT
Start: 2022-09-14 | End: 2022-09-14

## 2022-09-14 RX ORDER — CEPHALEXIN 500 MG/1
500 CAPSULE ORAL 4 TIMES DAILY
Qty: 28 CAPSULE | Refills: 0 | Status: SHIPPED | OUTPATIENT
Start: 2022-09-14 | End: 2022-09-21

## 2022-09-14 RX ORDER — PREDNISONE 20 MG/1
40 TABLET ORAL DAILY
Qty: 8 TABLET | Refills: 0 | Status: SHIPPED | OUTPATIENT
Start: 2022-09-14 | End: 2022-09-18

## 2022-09-14 RX ORDER — TRIAMCINOLONE ACETONIDE 5 MG/G
CREAM TOPICAL
Qty: 15 G | Refills: 0 | Status: SHIPPED | OUTPATIENT
Start: 2022-09-14 | End: 2022-09-21

## 2022-09-14 RX ADMIN — TRIAMCINOLONE ACETONIDE 40 MG: 40 INJECTION, SUSPENSION INTRA-ARTICULAR; INTRAMUSCULAR at 19:13

## 2022-09-14 ASSESSMENT — ENCOUNTER SYMPTOMS
WHEEZING: 0
ALLERGIC REACTION: 1
DIFFICULTY BREATHING: 0
HYPERVENTILATION: 0
TROUBLE SWALLOWING: 0
DIARRHEA: 0

## 2022-09-14 NOTE — PATIENT INSTRUCTIONS
Kenalog injection given today. Take prednisone starting tomorrow as prescribed for bee sting. Take keflex as prescribed for possible infection of sting.  Use steroid cream as prescribed for rash on buttocks/leg  If symptoms worsen or do not improve please follow-up with PCP or return to clinic  If rash on buttocks not improving then recommend follow up with dermatologist

## 2022-09-14 NOTE — PROGRESS NOTES
[address] WALK IN CARE  [address] [place] [address]  Dept: [phone]  Dept Fax: [phone]    Lewis Chapman is a 40 y.o. male who presents today for his medical conditions/complaintsas noted below. Lewis Chapman is c/o of Allergic Reaction (Left leg redness, swelling after bee sting x 1 day ago; has tried using ice, taking benadryl )        HPI:     Allergic Reaction  This is a new problem. The current episode started yesterday. The problem occurs constantly. The problem has been gradually worsening since onset. The problem is moderate. The patient was exposed to insect bite (patient reports wearing a harness at work). The time of exposure was just prior to onset. The exposure occurred at Home. Associated symptoms include itching and a rash (on left upper leg. Also on left buttocks/posterior tight for >1 year). Pertinent negatives include no diarrhea, difficulty breathing, hyperventilation, trouble swallowing or wheezing. Swelling location: arounf sting site. Past treatments include diphenhydramine (cool compress, NSAIDs). The treatment provided no relief.      Past Medical History:   Diagnosis Date    Back pain     Depression     Gastroesophageal reflux disease without esophagitis 12/19/2018    Hypertension     Substance abuse ([facility] [address]     tobacco    Past medical history reviewed and pertinent positives/negatives in the HPI    Past Surgical History:   Procedure Laterality Date    EYE SURGERY Left     metal removed    TOENAIL EXCISION      WISDOM TOOTH EXTRACTION         Family History   Problem Relation Age of Onset    Hypertension Mother     Migraines Mother     Arthritis Father     No Known Problems Sister     Hypertension Maternal Grandmother     Hypertension Maternal Grandfather     Diabetes Maternal Grandfather     Arthritis Paternal Grandmother     Stroke Paternal Grandfather     Kidney Disease Paternal Grandfather Arthritis Paternal Grandfather     Heart Disease Other     Lung Cancer Other        Social History     Tobacco Use    Smoking status: Never    Smokeless tobacco: Current     Types: Chew   Substance Use Topics    Alcohol use: Yes     Alcohol/week: 6.0 standard drinks     Types: 6 Cans of beer per week     Comment: 5 a day      Current Outpatient Medications   Medication Sig Dispense Refill    predniSONE (DELTASONE) 20 MG tablet Take 2 tablets by mouth daily for 4 days 8 tablet 0    cephALEXin (KEFLEX) 500 MG capsule Take 1 capsule by mouth 4 times daily for 7 days 28 capsule 0    triamcinolone (ARISTOCORT) 0.5 % cream Apply topically 3 times daily. 15 g 0    lisinopril (PRINIVIL;ZESTRIL) 20 MG tablet TAKE ONE TABLET BY MOUTH DAILY 30 tablet 11    famotidine (PEPCID) 40 MG tablet Take 1 tab po QD 30 tablet 11    nystatin (MYCOSTATIN) 341220 UNIT/GM ointment Apply topically 2 times daily. (Patient not taking: Reported on 9/14/2022) 30 g 1    clobetasol (TEMOVATE) 0.05 % cream Apply topically 2 times daily.  (Patient not taking: No sig reported) 60 g 0    fluticasone (FLONASE) 50 MCG/ACT nasal spray 2 sprays by Nasal route daily (Patient not taking: No sig reported) 1 Bottle 0     Current Facility-Administered Medications   Medication Dose Route Frequency Provider Last Rate Last Admin    triamcinolone acetonide (KENALOG-40) injection 40 mg  40 mg IntraMUSCular Once Hesham Murdock MD        methylPREDNISolone acetate (DEPO-MEDROL) injection 80 mg  80 mg Intra-artICUlar Once Boyd Pallas, DO        methylPREDNISolone acetate (DEPO-MEDROL) injection 80 mg  80 mg Intra-artICUlar Once Boyd Pallas, DO        bupivacaine (MARCAINE) 0.25 % injection 5 mg  2 mL Intra-artICUlar Once Boyd Pallas, DO        bupivacaine (MARCAINE) 0.25 % injection 5 mg  2 mL Intra-artICUlar Once Boyd Pallas, DO         Allergies   Allergen Reactions    Naproxen Other (See Comments)     Excessive somnolence  Has had no problem with other nsaids       Health Maintenance   Topic Date Due    COVID-19 Vaccine (1) Never done    Varicella vaccine (1 of 2 - 2-dose childhood series) Never done    HIV screen  Never done    Hepatitis C screen  Never done    Diabetes screen  Never done    Depression Monitoring  04/19/2022    Flu vaccine (1) 09/01/2022    Lipids  10/27/2023    DTaP/Tdap/Td vaccine (2 - Td or Tdap) 01/03/2030    Hepatitis A vaccine  Aged Out    Hepatitis B vaccine  Aged Out    Hib vaccine  Aged Out    Meningococcal (ACWY) vaccine  Aged Out    Pneumococcal 0-64 years Vaccine  Aged Out       :      Review of Systems   HENT:  Negative for trouble swallowing. Respiratory:  Negative for wheezing. Gastrointestinal:  Negative for diarrhea. Skin:  Positive for itching and rash (on left upper leg. Also on left buttocks/posterior tight for >1 year). Objective:     Physical Exam  Vitals and nursing note reviewed. Constitutional:       Appearance: Normal appearance. He is obese. HENT:      Head: Normocephalic and atraumatic. Right Ear: Hearing normal.      Left Ear: Hearing normal.      Mouth/Throat:      Lips: Pink. Eyes:      Extraocular Movements: Extraocular movements intact. Conjunctiva/sclera: Conjunctivae normal.   Cardiovascular:      Rate and Rhythm: Normal rate. Pulmonary:      Effort: Pulmonary effort is normal.   Musculoskeletal:      Cervical back: Normal range of motion. No muscular tenderness. Skin:     General: Skin is warm and dry. Findings: Erythema (warmth with swelling surround bee sting just above left knee) and rash (raised bumpy linear rash on left buttocks and upper left thigh) present. Neurological:      Mental Status: He is alert and oriented to person, place, and time. Mental status is at baseline. Psychiatric:         Mood and Affect: Mood normal.         Behavior: Behavior normal.         Thought Content:  Thought content normal. Judgment: Judgment normal.     /64   Pulse 94   SpO2 100%     Assessment:       Diagnosis Orders   1. Bee sting, accidental or unintentional, initial encounter        2. Pruritic rash            Plan:    Kenalog injection given today. Take prednisone starting tomorrow as prescribed for bee sting. Take keflex as prescribed for possible infection of sting. Use steroid cream as prescribed for rash on buttocks/leg  If symptoms worsen or do not improve please follow-up with PCP or return to clinic  If rash on buttocks not improving then recommend follow up with dermatologist    No orders of the defined types were placed in this encounter. Orders Placed This Encounter   Medications    triamcinolone acetonide (KENALOG-40) injection 40 mg    predniSONE (DELTASONE) 20 MG tablet     Sig: Take 2 tablets by mouth daily for 4 days     Dispense:  8 tablet     Refill:  0    cephALEXin (KEFLEX) 500 MG capsule     Sig: Take 1 capsule by mouth 4 times daily for 7 days     Dispense:  28 capsule     Refill:  0    triamcinolone (ARISTOCORT) 0.5 % cream     Sig: Apply topically 3 times daily. Dispense:  15 g     Refill:  0      Patient given educational materials - see patient instructions. Discussed use, benefit, and side effects of prescribed medications. All patient questions answered. Pt voiced understanding. Patient agreed with treatment plan. Follow up as directed.      Electronicallysigned by Zeke Claudio MD on 9/14/2022 at 7:08 PM

## 2022-10-11 DIAGNOSIS — K21.9 GASTROESOPHAGEAL REFLUX DISEASE WITHOUT ESOPHAGITIS: ICD-10-CM

## 2022-10-12 DIAGNOSIS — I10 ESSENTIAL HYPERTENSION: ICD-10-CM

## 2022-10-12 RX ORDER — LISINOPRIL 20 MG/1
TABLET ORAL
Qty: 30 TABLET | Refills: 0 | Status: SHIPPED | OUTPATIENT
Start: 2022-10-12

## 2022-10-12 RX ORDER — FAMOTIDINE 40 MG/1
TABLET, FILM COATED ORAL
Qty: 30 TABLET | Refills: 11 | Status: SHIPPED | OUTPATIENT
Start: 2022-10-12

## 2022-11-10 DIAGNOSIS — I10 ESSENTIAL HYPERTENSION: ICD-10-CM

## 2022-11-10 RX ORDER — LISINOPRIL 20 MG/1
TABLET ORAL
Qty: 30 TABLET | Refills: 0 | Status: SHIPPED | OUTPATIENT
Start: 2022-11-10 | End: 2022-11-14 | Stop reason: SDUPTHER

## 2022-11-14 ENCOUNTER — OFFICE VISIT (OUTPATIENT)
Dept: FAMILY MEDICINE CLINIC | Age: 42
End: 2022-11-14
Payer: COMMERCIAL

## 2022-11-14 VITALS
SYSTOLIC BLOOD PRESSURE: 116 MMHG | HEIGHT: 72 IN | TEMPERATURE: 98.1 F | HEART RATE: 63 BPM | BODY MASS INDEX: 33.86 KG/M2 | DIASTOLIC BLOOD PRESSURE: 68 MMHG | WEIGHT: 250 LBS | OXYGEN SATURATION: 91 %

## 2022-11-14 DIAGNOSIS — Z11.59 ENCOUNTER FOR HEPATITIS C SCREENING TEST FOR LOW RISK PATIENT: ICD-10-CM

## 2022-11-14 DIAGNOSIS — I10 ESSENTIAL HYPERTENSION: Primary | ICD-10-CM

## 2022-11-14 DIAGNOSIS — Z00.00 ROUTINE GENERAL MEDICAL EXAMINATION AT A HEALTH CARE FACILITY: ICD-10-CM

## 2022-11-14 DIAGNOSIS — Z13.1 DIABETES MELLITUS SCREENING: ICD-10-CM

## 2022-11-14 DIAGNOSIS — Z12.5 SCREENING FOR MALIGNANT NEOPLASM OF PROSTATE: ICD-10-CM

## 2022-11-14 DIAGNOSIS — Z13.220 LIPID SCREENING: ICD-10-CM

## 2022-11-14 PROCEDURE — 3074F SYST BP LT 130 MM HG: CPT | Performed by: NURSE PRACTITIONER

## 2022-11-14 PROCEDURE — 99213 OFFICE O/P EST LOW 20 MIN: CPT | Performed by: NURSE PRACTITIONER

## 2022-11-14 PROCEDURE — 3078F DIAST BP <80 MM HG: CPT | Performed by: NURSE PRACTITIONER

## 2022-11-14 RX ORDER — LISINOPRIL 20 MG/1
TABLET ORAL
Qty: 90 TABLET | Refills: 2 | Status: SHIPPED | OUTPATIENT
Start: 2022-11-14

## 2022-11-14 SDOH — ECONOMIC STABILITY: FOOD INSECURITY: WITHIN THE PAST 12 MONTHS, THE FOOD YOU BOUGHT JUST DIDN'T LAST AND YOU DIDN'T HAVE MONEY TO GET MORE.: NEVER TRUE

## 2022-11-14 SDOH — ECONOMIC STABILITY: FOOD INSECURITY: WITHIN THE PAST 12 MONTHS, YOU WORRIED THAT YOUR FOOD WOULD RUN OUT BEFORE YOU GOT MONEY TO BUY MORE.: NEVER TRUE

## 2022-11-14 ASSESSMENT — PATIENT HEALTH QUESTIONNAIRE - PHQ9
SUM OF ALL RESPONSES TO PHQ QUESTIONS 1-9: 0
4. FEELING TIRED OR HAVING LITTLE ENERGY: 0
SUM OF ALL RESPONSES TO PHQ9 QUESTIONS 1 & 2: 0
SUM OF ALL RESPONSES TO PHQ QUESTIONS 1-9: 0
2. FEELING DOWN, DEPRESSED OR HOPELESS: 0
SUM OF ALL RESPONSES TO PHQ QUESTIONS 1-9: 0
5. POOR APPETITE OR OVEREATING: 0
9. THOUGHTS THAT YOU WOULD BE BETTER OFF DEAD, OR OF HURTING YOURSELF: 0
8. MOVING OR SPEAKING SO SLOWLY THAT OTHER PEOPLE COULD HAVE NOTICED. OR THE OPPOSITE, BEING SO FIGETY OR RESTLESS THAT YOU HAVE BEEN MOVING AROUND A LOT MORE THAN USUAL: 0
6. FEELING BAD ABOUT YOURSELF - OR THAT YOU ARE A FAILURE OR HAVE LET YOURSELF OR YOUR FAMILY DOWN: 0
7. TROUBLE CONCENTRATING ON THINGS, SUCH AS READING THE NEWSPAPER OR WATCHING TELEVISION: 0
SUM OF ALL RESPONSES TO PHQ QUESTIONS 1-9: 0
1. LITTLE INTEREST OR PLEASURE IN DOING THINGS: 0
10. IF YOU CHECKED OFF ANY PROBLEMS, HOW DIFFICULT HAVE THESE PROBLEMS MADE IT FOR YOU TO DO YOUR WORK, TAKE CARE OF THINGS AT HOME, OR GET ALONG WITH OTHER PEOPLE: 0
3. TROUBLE FALLING OR STAYING ASLEEP: 0

## 2022-11-14 ASSESSMENT — ENCOUNTER SYMPTOMS
COUGH: 0
VOMITING: 0
SHORTNESS OF BREATH: 0
CHEST TIGHTNESS: 0
NAUSEA: 0
ABDOMINAL PAIN: 0

## 2022-11-14 ASSESSMENT — SOCIAL DETERMINANTS OF HEALTH (SDOH): HOW HARD IS IT FOR YOU TO PAY FOR THE VERY BASICS LIKE FOOD, HOUSING, MEDICAL CARE, AND HEATING?: NOT HARD AT ALL

## 2022-11-14 NOTE — PROGRESS NOTES
[address]  [place], [address]  [phone]      Sharon Amezcua is a 40 y.o. male who presents today for his  medicalconditions/complaints as noted below. Sharon Amezcua is c/o of Hypertension  . HPI:    HPI  Here today for routine visit for refill on Lisinopril. Reports he intermittently checks blood pressure and has been normal. Denies chest pain, shortness of breath, palpitations, leg swelling. He uses chewing tobacco and denies readiness to quit. Past Medical History:   Diagnosis Date    Back pain     Depression     Gastroesophageal reflux disease without esophagitis 12/19/2018    Hypertension     Substance abuse ([facility] [address]     tobacco      Past Surgical History:   Procedure Laterality Date    EYE SURGERY Left     metal removed    TOENAIL EXCISION      WISDOM TOOTH EXTRACTION       Family History   Problem Relation Age of Onset    Hypertension Mother     Migraines Mother     Arthritis Father     No Known Problems Sister     Hypertension Maternal Grandmother     Hypertension Maternal Grandfather     Diabetes Maternal Grandfather     Arthritis Paternal Grandmother     Stroke Paternal Grandfather     Kidney Disease Paternal Grandfather     Arthritis Paternal Grandfather     Heart Disease Other     Lung Cancer Other      Social History     Tobacco Use    Smoking status: Never    Smokeless tobacco: Current     Types: Chew   Substance Use Topics    Alcohol use: Yes     Alcohol/week: 6.0 standard drinks     Types: 6 Cans of beer per week     Comment: 5 a day      Current Outpatient Medications   Medication Sig Dispense Refill    lisinopril (PRINIVIL;ZESTRIL) 20 MG tablet TAKE ONE TABLET BY MOUTH DAILY 90 tablet 2    famotidine (PEPCID) 40 MG tablet TAKE ONE TABLET BY MOUTH DAILY 30 tablet 11     No current facility-administered medications for this visit.      Allergies   Allergen Reactions    Naproxen Other (See Comments)     Excessive somnolence  Has had no problem with other nsaids       Health Maintenance   Topic Date Due    COVID-19 Vaccine (1) Never done    Varicella vaccine (1 of 2 - 2-dose childhood series) Never done    HIV screen  Never done    Hepatitis C screen  Never done    Diabetes screen  Never done    Flu vaccine (1) 11/14/2023 (Originally 8/1/2022)    Lipids  10/27/2023    Depression Monitoring  11/14/2023    DTaP/Tdap/Td vaccine (2 - Td or Tdap) 01/03/2030    Orthopox (Smallpox/Monkeypox) Vaccine  Completed    Hepatitis A vaccine  Aged Out    Hib vaccine  Aged Out    Meningococcal (ACWY) vaccine  Aged Out    Pneumococcal 0-64 years Vaccine  Aged Out       Subjective:      Review of Systems   Constitutional:  Negative for appetite change, chills, diaphoresis, fatigue and fever. Eyes:  Negative for visual disturbance. Respiratory:  Negative for cough, chest tightness and shortness of breath. Cardiovascular:  Negative for chest pain, palpitations and leg swelling. Gastrointestinal:  Negative for abdominal pain, nausea and vomiting. Skin:  Negative for rash. Neurological:  Negative for dizziness, syncope, weakness, light-headedness and headaches. Hematological:  Negative for adenopathy. Psychiatric/Behavioral:  Negative for dysphoric mood and sleep disturbance. The patient is not nervous/anxious. Objective:      Physical Exam  Vitals and nursing note reviewed. Constitutional:       General: He is not in acute distress. Appearance: Normal appearance. He is well-developed. He is not ill-appearing or diaphoretic. HENT:      Head: Normocephalic and atraumatic. Eyes:      Conjunctiva/sclera: Conjunctivae normal.   Cardiovascular:      Rate and Rhythm: Normal rate and regular rhythm. Pulses: Normal pulses. Heart sounds: Normal heart sounds. No murmur heard. Comments: No LE edema  Pulmonary:      Effort: Pulmonary effort is normal.      Breath sounds: Normal breath sounds. Musculoskeletal:      Cervical back: Neck supple. Skin:     General: Skin is warm and dry. Neurological:      General: No focal deficit present. Mental Status: He is alert and oriented to person, place, and time. Mental status is at baseline. Psychiatric:         Mood and Affect: Mood normal.         Behavior: Behavior normal.         Thought Content: Thought content normal.         Judgment: Judgment normal.       Assessment:       Diagnosis Orders   1. Essential hypertension  lisinopril (PRINIVIL;ZESTRIL) 20 MG tablet      2. Routine general medical examination at a health care facility  Lipid Panel    CBC with Auto Differential    Comprehensive Metabolic Panel    Hemoglobin A1C    PSA Screening      3. Lipid screening  Lipid Panel      4. Diabetes mellitus screening  Hemoglobin A1C      5. Screening for malignant neoplasm of prostate  PSA Screening      6. Encounter for hepatitis C screening test for low risk patient  Hepatitis C Antibody        Wt Readings from Last 3 Encounters:   11/14/22 250 lb (113.4 kg)   12/16/21 258 lb (117 kg)   10/04/21 258 lb (117 kg)     BP Readings from Last 3 Encounters:   11/14/22 116/68   09/14/22 112/64   01/16/22 132/82         Plan:      Return in about 1 year (around 11/14/2023) for return for bp check/HTN. Orders Placed This Encounter   Procedures    Lipid Panel     Standing Status:   Future     Standing Expiration Date:   11/14/2023     Order Specific Question:   Is Patient Fasting?/# of Hours     Answer:   8     Order Specific Question:   Has the patient fasted?      Answer:   Yes    CBC with Auto Differential     Standing Status:   Future     Standing Expiration Date:   11/14/2023    Comprehensive Metabolic Panel     Standing Status:   Future     Standing Expiration Date:   11/14/2023    Hemoglobin A1C     Standing Status:   Future     Standing Expiration Date:   11/14/2023    PSA Screening     Standing Status:   Future     Standing Expiration Date:   11/14/2023    Hepatitis C Antibody     Standing Status: Future     Standing Expiration Date:   11/15/2023     Orders Placed This Encounter   Medications    lisinopril (PRINIVIL;ZESTRIL) 20 MG tablet     Sig: TAKE ONE TABLET BY MOUTH DAILY     Dispense:  90 tablet     Refill:  2     Htn controlled  Refills given  Update labs  Will call with test results  DASH LF diet and exercise as sable    Patient given educational materials - see patient instructions. Discussed use,benefit, and side effects of prescribed medications. All patient questions answered. Pt voiced understanding. Reviewed health maintenance. Instructed to continue currentmedications, diet and exercise.     Electronically signed by JOSE Santiago CNP, CNP on 11/14/2022 at 5:10 PM

## 2022-11-14 NOTE — PROGRESS NOTES
Visit Information    Have you changed or started any medications since your last visit including any over-the-counter medicines, vitamins, or herbal medicines? no   Have you stopped taking any of your medications? Is so, why? -  no  Are you having any side effects from any of your medications? - no    Have you seen any other physician or provider since your last visit?  no   Have you had any other diagnostic tests since your last visit?  no   Have you been seen in the emergency room and/or had an admission in a hospital since we last saw you?  no   Have you had your routine dental cleaning in the past 6 months?  no     Do you have an active MyChart account? If no, what is the barrier?   Yes    Patient Care Team:  JOSE Conklin CNP as PCP - General (Nurse Practitioner)  JOSE Conklin CNP as PCP - [facility]  JOSE Conklin CNP as Referring Physician (Nurse Practitioner)  JOSE Conklin CNP as Referring Physician (Nurse Practitioner)    Medical History Review  Past Medical, Family, and Social History reviewed and  contribute to the patient presenting condition    Health Maintenance   Topic Date Due    COVID-19 Vaccine (1) Never done    Varicella vaccine (1 of 2 - 2-dose childhood series) Never done    HIV screen  Never done    Hepatitis C screen  Never done    Diabetes screen  Never done    Depression Monitoring  04/19/2022    Flu vaccine (1) 08/01/2022    Lipids  10/27/2023    DTaP/Tdap/Td vaccine (2 - Td or Tdap) 01/03/2030    Orthopox (Smallpox/Monkeypox) Vaccine  Completed    Hepatitis A vaccine  Aged Out    Hib vaccine  Aged Out    Meningococcal (ACWY) vaccine  Aged Out    Pneumococcal 0-64 years Vaccine  Aged Out

## 2022-12-20 ENCOUNTER — OFFICE VISIT (OUTPATIENT)
Dept: PRIMARY CARE CLINIC | Age: 42
End: 2022-12-20
Payer: COMMERCIAL

## 2022-12-20 VITALS
DIASTOLIC BLOOD PRESSURE: 80 MMHG | TEMPERATURE: 98 F | OXYGEN SATURATION: 98 % | SYSTOLIC BLOOD PRESSURE: 123 MMHG | HEART RATE: 92 BPM

## 2022-12-20 DIAGNOSIS — H66.90 ACUTE OTITIS MEDIA, UNSPECIFIED OTITIS MEDIA TYPE: ICD-10-CM

## 2022-12-20 DIAGNOSIS — J01.00 ACUTE NON-RECURRENT MAXILLARY SINUSITIS: Primary | ICD-10-CM

## 2022-12-20 PROCEDURE — 3078F DIAST BP <80 MM HG: CPT | Performed by: FAMILY MEDICINE

## 2022-12-20 PROCEDURE — 99213 OFFICE O/P EST LOW 20 MIN: CPT | Performed by: FAMILY MEDICINE

## 2022-12-20 PROCEDURE — 3074F SYST BP LT 130 MM HG: CPT | Performed by: FAMILY MEDICINE

## 2022-12-20 RX ORDER — AMOXICILLIN AND CLAVULANATE POTASSIUM 875; 125 MG/1; MG/1
1 TABLET, FILM COATED ORAL 2 TIMES DAILY
Qty: 14 TABLET | Refills: 0 | Status: SHIPPED | OUTPATIENT
Start: 2022-12-20 | End: 2022-12-27

## 2022-12-20 ASSESSMENT — ENCOUNTER SYMPTOMS
SINUS COMPLAINT: 1
COUGH: 1
SINUS PRESSURE: 1
SORE THROAT: 0

## 2022-12-20 NOTE — PATIENT INSTRUCTIONS
Take augmentin as prescribed for sinus/ear infection  Continue over the counter cough/cold medications as needed for symptoms  If symptoms worsen or do not improve please follow-up with PCP or return to clinic

## 2022-12-20 NOTE — PROGRESS NOTES
[address] WALK IN CARE  [address] [address]  Dept: [phone]  Dept Fax: [phone]    Sang Smith is a 40 y.o. male who presents today for his medical conditions/complaintsas noted below. Sang Smith is c/o of Otalgia (Lt after blowing nose on Sunday with facial pain that started a while ago)        HPI:     Sinus Problem  This is a new problem. The current episode started more than 1 month ago. The problem has been gradually worsening (over past 5 days) since onset. There has been no fever. Associated symptoms include congestion, coughing, ear pain and sinus pressure (facial pain). Pertinent negatives include no sore throat. (Loss of balance) Treatments tried: cough syrup, neti pot. The treatment provided mild relief. Past medical history of GERD, hypertension, tobacco use  Family members are also sick  Past Medical History:   Diagnosis Date    Back pain     Depression     Gastroesophageal reflux disease without esophagitis 12/19/2018    Hypertension     Substance abuse ([facility] [address]     tobacco    Past medical history reviewed and pertinent positives/negatives in the HPI    Past Surgical History:   Procedure Laterality Date    EYE SURGERY Left     metal removed    TOENAIL EXCISION      WISDOM TOOTH EXTRACTION         Family History   Problem Relation Age of Onset    Hypertension Mother     Migraines Mother     Arthritis Father     No Known Problems Sister     Hypertension Maternal Grandmother     Hypertension Maternal Grandfather     Diabetes Maternal Grandfather     Arthritis Paternal Grandmother     Stroke Paternal Grandfather     Kidney Disease Paternal Grandfather     Arthritis Paternal Grandfather     Heart Disease Other     Lung Cancer Other        Social History     Tobacco Use    Smoking status: Never    Smokeless tobacco: Current     Types: Chew   Substance Use Topics    Alcohol use:  Yes     Alcohol/week: 6.0 standard drinks     Types: 6 Cans of beer per week     Comment: 5 a day      Current Outpatient Medications   Medication Sig Dispense Refill    amoxicillin-clavulanate (AUGMENTIN) 875-125 MG per tablet Take 1 tablet by mouth 2 times daily for 7 days 14 tablet 0    lisinopril (PRINIVIL;ZESTRIL) 20 MG tablet TAKE ONE TABLET BY MOUTH DAILY 90 tablet 2    famotidine (PEPCID) 40 MG tablet TAKE ONE TABLET BY MOUTH DAILY 30 tablet 11     No current facility-administered medications for this visit. Allergies   Allergen Reactions    Naproxen Other (See Comments)     Excessive somnolence  Has had no problem with other nsaids       Health Maintenance   Topic Date Due    COVID-19 Vaccine (1) Never done    Varicella vaccine (1 of 2 - 2-dose childhood series) Never done    HIV screen  Never done    Hepatitis C screen  Never done    Diabetes screen  Never done    Flu vaccine (1) 11/14/2023 (Originally 8/1/2022)    Lipids  10/27/2023    Depression Monitoring  11/14/2023    DTaP/Tdap/Td vaccine (2 - Td or Tdap) 01/03/2030    Orthopox (Smallpox/Monkeypox) Vaccine  Completed    Hepatitis A vaccine  Aged Out    Hib vaccine  Aged Out    Meningococcal (ACWY) vaccine  Aged Out    Pneumococcal 0-64 years Vaccine  Aged Out       :      Review of Systems   HENT:  Positive for congestion, ear pain and sinus pressure (facial pain). Negative for sore throat. Respiratory:  Positive for cough. Objective:     Physical Exam  Vitals and nursing note reviewed. Constitutional:       Appearance: Normal appearance. He is obese. HENT:      Head: Normocephalic and atraumatic. Right Ear: Hearing, tympanic membrane, ear canal and external ear normal.      Left Ear: Hearing, ear canal and external ear normal. A middle ear effusion is present. Tympanic membrane is erythematous and bulging. Nose: Mucosal edema and congestion present. Left Sinus: Maxillary sinus tenderness present. Mouth/Throat:      Lips: Pink. Mouth: Mucous membranes are moist.      Pharynx: Oropharynx is clear. Posterior oropharyngeal erythema present. Comments: Postnasal drip  Eyes:      Extraocular Movements: Extraocular movements intact. Conjunctiva/sclera: Conjunctivae normal.   Cardiovascular:      Rate and Rhythm: Normal rate and regular rhythm. Heart sounds: Normal heart sounds. Pulmonary:      Effort: Pulmonary effort is normal.      Breath sounds: Normal breath sounds. Musculoskeletal:      Cervical back: Normal range of motion. No muscular tenderness. Skin:     General: Skin is warm and dry. Neurological:      Mental Status: He is alert and oriented to person, place, and time. Mental status is at baseline. Psychiatric:         Mood and Affect: Mood normal.         Behavior: Behavior normal.         Thought Content: Thought content normal.         Judgment: Judgment normal.     /80   Pulse 92   Temp 98 °F (36.7 °C) (Oral)   SpO2 98%     Assessment:       Diagnosis Orders   1. Acute non-recurrent maxillary sinusitis        2. Acute otitis media, unspecified otitis media type            Plan:      Take augmentin as prescribed for sinus/ear infection  Continue over the counter cough/cold medications as needed for symptoms  If symptoms worsen or do not improve please follow-up with PCP or return to clinic  No orders of the defined types were placed in this encounter. Orders Placed This Encounter   Medications    amoxicillin-clavulanate (AUGMENTIN) 875-125 MG per tablet     Sig: Take 1 tablet by mouth 2 times daily for 7 days     Dispense:  14 tablet     Refill:  0      Patient given educational materials - see patient instructions. Discussed use, benefit, and side effects of prescribed medications. All patient questions answered. Pt voiced understanding. Patient agreed with treatment plan. Follow up as directed.      Electronicallysigned by Marizol Bai MD on 12/20/2022 at 5:48 PM

## 2023-03-28 DIAGNOSIS — I10 ESSENTIAL HYPERTENSION: ICD-10-CM

## 2023-03-28 DIAGNOSIS — K21.9 GASTROESOPHAGEAL REFLUX DISEASE WITHOUT ESOPHAGITIS: ICD-10-CM

## 2023-03-28 RX ORDER — FAMOTIDINE 40 MG/1
TABLET, FILM COATED ORAL
Qty: 30 TABLET | Refills: 11 | Status: SHIPPED | OUTPATIENT
Start: 2023-03-28

## 2023-03-28 RX ORDER — LISINOPRIL 20 MG/1
TABLET ORAL
Qty: 90 TABLET | Refills: 2 | Status: SHIPPED | OUTPATIENT
Start: 2023-03-28

## 2023-05-12 ENCOUNTER — NURSE ONLY (OUTPATIENT)
Dept: PRIMARY CARE CLINIC | Age: 43
End: 2023-05-12
Payer: COMMERCIAL

## 2023-05-12 VITALS
HEART RATE: 97 BPM | TEMPERATURE: 98.5 F | SYSTOLIC BLOOD PRESSURE: 143 MMHG | DIASTOLIC BLOOD PRESSURE: 88 MMHG | OXYGEN SATURATION: 97 %

## 2023-05-12 DIAGNOSIS — S62.645A CLOSED NONDISPLACED FRACTURE OF PROXIMAL PHALANX OF LEFT RING FINGER, INITIAL ENCOUNTER: Primary | ICD-10-CM

## 2023-05-12 DIAGNOSIS — S69.92XA INJURY OF LEFT RING FINGER, INITIAL ENCOUNTER: ICD-10-CM

## 2023-05-12 PROCEDURE — 3079F DIAST BP 80-89 MM HG: CPT | Performed by: NURSE PRACTITIONER

## 2023-05-12 PROCEDURE — 99213 OFFICE O/P EST LOW 20 MIN: CPT | Performed by: NURSE PRACTITIONER

## 2023-05-12 PROCEDURE — 3077F SYST BP >= 140 MM HG: CPT | Performed by: NURSE PRACTITIONER

## 2023-05-12 ASSESSMENT — ENCOUNTER SYMPTOMS
COUGH: 0
SHORTNESS OF BREATH: 0
COLOR CHANGE: 0
WHEEZING: 0
CHEST TIGHTNESS: 0
RESPIRATORY NEGATIVE: 1

## 2023-05-12 NOTE — PROGRESS NOTES
[address] WALK IN CARE  [address] [address]  Dept: [phone]  Dept Fax: [phone]     General Friend is a 40 y.o. male who presents to the urgent care today for his medicalconditions/complaints as noted below. General Friend is c/o of Finger Injury (Lt 4th finger - [facility])    HPI:      Hand Injury   The incident occurred 1 to 3 hours ago. [facility] The pain is present in the left fingers (left ring finger). The quality of the pain is described as aching. The pain does not radiate. The pain is moderate. Pertinent negatives include no chest pain, numbness or tingling. The symptoms are aggravated by movement, lifting and palpation. He has tried nothing for the symptoms. The treatment provided no relief. Past Medical History:   Diagnosis Date    Back pain     Depression     Gastroesophageal reflux disease without esophagitis 12/19/2018    Hypertension     Substance abuse ([facility])     tobacco      Current Outpatient Medications   Medication Sig Dispense Refill    famotidine (PEPCID) 40 MG tablet TAKE ONE TABLET BY MOUTH DAILY 30 tablet 11    lisinopril (PRINIVIL;ZESTRIL) 20 MG tablet TAKE ONE TABLET BY MOUTH DAILY 90 tablet 2     No current facility-administered medications for this visit. Allergies   Allergen Reactions    Naproxen Other (See Comments)     Excessive somnolence  Has had no problem with other nsaids     Reviewed PMH, SH, and FH with the patient and updated. Subjective:      Review of Systems   Constitutional:  Negative for chills, fatigue and fever. Respiratory: Negative. Negative for cough, chest tightness, shortness of breath and wheezing. Cardiovascular: Negative. Negative for chest pain. Musculoskeletal:  Positive for arthralgias (left ring finger) and joint swelling (left ring finger).    Skin:  Negative for

## 2023-09-01 ENCOUNTER — OFFICE VISIT (OUTPATIENT)
Dept: PRIMARY CARE CLINIC | Age: 43
End: 2023-09-01

## 2023-09-01 VITALS
HEART RATE: 67 BPM | OXYGEN SATURATION: 98 % | TEMPERATURE: 98.7 F | SYSTOLIC BLOOD PRESSURE: 123 MMHG | DIASTOLIC BLOOD PRESSURE: 81 MMHG

## 2023-09-01 DIAGNOSIS — Z91.038 ALLERGIC REACTION TO INSECT BITE: Primary | ICD-10-CM

## 2023-09-01 RX ORDER — PREDNISONE 20 MG/1
40 TABLET ORAL DAILY
Qty: 10 TABLET | Refills: 0 | Status: SHIPPED | OUTPATIENT
Start: 2023-09-01 | End: 2023-09-06

## 2023-09-01 ASSESSMENT — ENCOUNTER SYMPTOMS
CHEST TIGHTNESS: 0
SHORTNESS OF BREATH: 0
WHEEZING: 0
COUGH: 0
RESPIRATORY NEGATIVE: 1

## 2023-09-01 NOTE — PROGRESS NOTES
[address] [facility] [place] [facility]  [address] [address]  Dept: [phone]  Dept Fax: [phone]     Ga Ashley is a 44 y.o. male who presents to the urgent care today for his medicalconditions/complaints as noted below. Ga Ashley is c/o of Insect Bite (Swelling worsening, redness, 9am yesterday, itchy)    HPI:      Rash  This is a new problem. The current episode started yesterday. The problem has been gradually worsening since onset. Location: left forearm. The rash is characterized by redness, swelling and itchiness. He was exposed to an insect bite/sting. Pertinent negatives include no cough, fatigue, fever or shortness of breath. Past treatments include nothing. The treatment provided no relief. Past Medical History:   Diagnosis Date    Back pain     Depression     Gastroesophageal reflux disease without esophagitis 12/19/2018    Hypertension     Substance abuse (HCC)     tobacco           Current Outpatient Medications   Medication Sig Dispense Refill    predniSONE (DELTASONE) 20 MG tablet Take 2 tablets by mouth daily for 5 days 10 tablet 0    lisinopril (PRINIVIL;ZESTRIL) 20 MG tablet TAKE ONE TABLET BY MOUTH DAILY 90 tablet 2    famotidine (PEPCID) 40 MG tablet TAKE ONE TABLET BY MOUTH DAILY (Patient not taking: Reported on 9/1/2023) 30 tablet 11     No current facility-administered medications for this visit. Allergies   Allergen Reactions    Naproxen Other (See Comments)     Excessive somnolence  Has had no problem with other nsaids       Subjective:      Review of Systems   Constitutional:  Negative for chills, fatigue and fever. Respiratory: Negative. Negative for cough, chest tightness, shortness of breath and wheezing. Cardiovascular: Negative. Negative for chest pain. Musculoskeletal:  Positive for joint swelling (left forearm and left elbow). Negative for arthralgias and gait problem.    Skin:

## 2023-11-20 ENCOUNTER — OFFICE VISIT (OUTPATIENT)
Dept: PRIMARY CARE CLINIC | Age: 43
End: 2023-11-20
Payer: COMMERCIAL

## 2023-11-20 VITALS
OXYGEN SATURATION: 98 % | TEMPERATURE: 98.2 F | HEIGHT: 72 IN | WEIGHT: 255 LBS | SYSTOLIC BLOOD PRESSURE: 119 MMHG | BODY MASS INDEX: 34.54 KG/M2 | DIASTOLIC BLOOD PRESSURE: 79 MMHG | HEART RATE: 97 BPM

## 2023-11-20 DIAGNOSIS — S61.211A LACERATION OF LEFT INDEX FINGER WITHOUT FOREIGN BODY WITHOUT DAMAGE TO NAIL, INITIAL ENCOUNTER: Primary | ICD-10-CM

## 2023-11-20 PROCEDURE — 3074F SYST BP LT 130 MM HG: CPT | Performed by: FAMILY MEDICINE

## 2023-11-20 PROCEDURE — 99213 OFFICE O/P EST LOW 20 MIN: CPT | Performed by: FAMILY MEDICINE

## 2023-11-20 PROCEDURE — 3078F DIAST BP <80 MM HG: CPT | Performed by: FAMILY MEDICINE

## 2023-11-21 NOTE — PATIENT INSTRUCTIONS
Keep wound clean and dry. Change dressing after 12-24 hours.  Wash with warm water and mild soap, Apply antibiotic ointment and new bandage if going to be using the hand for tasks, otherwise you may leave it open to the air  Return in 7-10 days for suture removal  If you notice any sign of infection such as increased redness, swelling discharge or fever please follow up

## 2024-01-28 DIAGNOSIS — I10 ESSENTIAL HYPERTENSION: ICD-10-CM

## 2024-01-28 RX ORDER — LISINOPRIL 20 MG/1
TABLET ORAL
Qty: 30 TABLET | Refills: 0 | Status: SHIPPED | OUTPATIENT
Start: 2024-01-28

## 2024-03-07 DIAGNOSIS — I10 ESSENTIAL HYPERTENSION: ICD-10-CM

## 2024-03-07 RX ORDER — LISINOPRIL 20 MG/1
TABLET ORAL
Qty: 30 TABLET | Refills: 0 | Status: SHIPPED | OUTPATIENT
Start: 2024-03-07

## 2024-04-07 DIAGNOSIS — I10 ESSENTIAL HYPERTENSION: ICD-10-CM

## 2024-04-07 RX ORDER — LISINOPRIL 20 MG/1
TABLET ORAL
Qty: 30 TABLET | Refills: 0 | Status: SHIPPED | OUTPATIENT
Start: 2024-04-07

## 2024-04-08 DIAGNOSIS — I10 ESSENTIAL HYPERTENSION: ICD-10-CM

## 2024-04-08 RX ORDER — LISINOPRIL 20 MG/1
TABLET ORAL
Qty: 90 TABLET | OUTPATIENT
Start: 2024-04-08

## 2024-05-06 ENCOUNTER — OFFICE VISIT (OUTPATIENT)
Dept: FAMILY MEDICINE CLINIC | Age: 44
End: 2024-05-06
Payer: COMMERCIAL

## 2024-05-06 VITALS
OXYGEN SATURATION: 96 % | BODY MASS INDEX: 39.42 KG/M2 | DIASTOLIC BLOOD PRESSURE: 76 MMHG | SYSTOLIC BLOOD PRESSURE: 114 MMHG | WEIGHT: 291 LBS | TEMPERATURE: 97 F | HEIGHT: 72 IN | HEART RATE: 91 BPM

## 2024-05-06 DIAGNOSIS — Z00.00 ROUTINE GENERAL MEDICAL EXAMINATION AT A HEALTH CARE FACILITY: ICD-10-CM

## 2024-05-06 DIAGNOSIS — Z13.1 DIABETES MELLITUS SCREENING: ICD-10-CM

## 2024-05-06 DIAGNOSIS — I10 ESSENTIAL HYPERTENSION: ICD-10-CM

## 2024-05-06 DIAGNOSIS — Z12.5 SCREENING FOR MALIGNANT NEOPLASM OF PROSTATE: ICD-10-CM

## 2024-05-06 DIAGNOSIS — L30.9 DERMATITIS: Primary | ICD-10-CM

## 2024-05-06 DIAGNOSIS — Z13.220 LIPID SCREENING: ICD-10-CM

## 2024-05-06 DIAGNOSIS — E66.01 SEVERE OBESITY (BMI 35.0-39.9) WITH COMORBIDITY (HCC): ICD-10-CM

## 2024-05-06 PROCEDURE — 99214 OFFICE O/P EST MOD 30 MIN: CPT | Performed by: NURSE PRACTITIONER

## 2024-05-06 PROCEDURE — 3078F DIAST BP <80 MM HG: CPT | Performed by: NURSE PRACTITIONER

## 2024-05-06 PROCEDURE — 3074F SYST BP LT 130 MM HG: CPT | Performed by: NURSE PRACTITIONER

## 2024-05-06 RX ORDER — BETAMETHASONE DIPROPIONATE 0.05 %
OINTMENT (GRAM) TOPICAL
Qty: 50 G | Refills: 0 | Status: SHIPPED | OUTPATIENT
Start: 2024-05-06

## 2024-05-06 RX ORDER — TRIAMCINOLONE ACETONIDE 40 MG/ML
60 INJECTION, SUSPENSION INTRA-ARTICULAR; INTRAMUSCULAR ONCE
Status: COMPLETED | OUTPATIENT
Start: 2024-05-06 | End: 2024-05-07

## 2024-05-06 RX ORDER — LISINOPRIL 20 MG/1
20 TABLET ORAL DAILY
Qty: 90 TABLET | Refills: 1 | Status: SHIPPED | OUTPATIENT
Start: 2024-05-06

## 2024-05-06 SDOH — ECONOMIC STABILITY: FOOD INSECURITY: WITHIN THE PAST 12 MONTHS, YOU WORRIED THAT YOUR FOOD WOULD RUN OUT BEFORE YOU GOT MONEY TO BUY MORE.: NEVER TRUE

## 2024-05-06 SDOH — ECONOMIC STABILITY: FOOD INSECURITY: WITHIN THE PAST 12 MONTHS, THE FOOD YOU BOUGHT JUST DIDN'T LAST AND YOU DIDN'T HAVE MONEY TO GET MORE.: NEVER TRUE

## 2024-05-06 SDOH — ECONOMIC STABILITY: HOUSING INSECURITY
IN THE LAST 12 MONTHS, WAS THERE A TIME WHEN YOU DID NOT HAVE A STEADY PLACE TO SLEEP OR SLEPT IN A SHELTER (INCLUDING NOW)?: NO

## 2024-05-06 SDOH — ECONOMIC STABILITY: INCOME INSECURITY: HOW HARD IS IT FOR YOU TO PAY FOR THE VERY BASICS LIKE FOOD, HOUSING, MEDICAL CARE, AND HEATING?: NOT HARD AT ALL

## 2024-05-06 ASSESSMENT — ENCOUNTER SYMPTOMS
NAUSEA: 0
ABDOMINAL PAIN: 0
BACK PAIN: 0
CHEST TIGHTNESS: 0
VOMITING: 0
SHORTNESS OF BREATH: 0
COUGH: 0

## 2024-05-06 ASSESSMENT — PATIENT HEALTH QUESTIONNAIRE - PHQ9
SUM OF ALL RESPONSES TO PHQ9 QUESTIONS 1 & 2: 0
10. IF YOU CHECKED OFF ANY PROBLEMS, HOW DIFFICULT HAVE THESE PROBLEMS MADE IT FOR YOU TO DO YOUR WORK, TAKE CARE OF THINGS AT HOME, OR GET ALONG WITH OTHER PEOPLE: NOT DIFFICULT AT ALL
3. TROUBLE FALLING OR STAYING ASLEEP: NOT AT ALL
8. MOVING OR SPEAKING SO SLOWLY THAT OTHER PEOPLE COULD HAVE NOTICED. OR THE OPPOSITE, BEING SO FIGETY OR RESTLESS THAT YOU HAVE BEEN MOVING AROUND A LOT MORE THAN USUAL: NOT AT ALL
4. FEELING TIRED OR HAVING LITTLE ENERGY: NOT AT ALL
SUM OF ALL RESPONSES TO PHQ QUESTIONS 1-9: 0
9. THOUGHTS THAT YOU WOULD BE BETTER OFF DEAD, OR OF HURTING YOURSELF: NOT AT ALL
5. POOR APPETITE OR OVEREATING: NOT AT ALL
1. LITTLE INTEREST OR PLEASURE IN DOING THINGS: NOT AT ALL
SUM OF ALL RESPONSES TO PHQ QUESTIONS 1-9: 0
SUM OF ALL RESPONSES TO PHQ QUESTIONS 1-9: 0
6. FEELING BAD ABOUT YOURSELF - OR THAT YOU ARE A FAILURE OR HAVE LET YOURSELF OR YOUR FAMILY DOWN: NOT AT ALL
SUM OF ALL RESPONSES TO PHQ QUESTIONS 1-9: 0
7. TROUBLE CONCENTRATING ON THINGS, SUCH AS READING THE NEWSPAPER OR WATCHING TELEVISION: NOT AT ALL

## 2024-05-06 NOTE — PROGRESS NOTES
Visit Information    Have you changed or started any medications since your last visit including any over-the-counter medicines, vitamins, or herbal medicines? no   Have you stopped taking any of your medications? Is so, why? -  no  Are you having any side effects from any of your medications? - no    Have you seen any other physician or provider since your last visit?  no   Have you had any other diagnostic tests since your last visit?  no   Have you been seen in the emergency room and/or had an admission in a hospital since we last saw you?  no   Have you had your routine dental cleaning in the past 6 months?  no     Do you have an active MyChart account? If no, what is the barrier?  Yes    Patient Care Team:  Carmela Kerr APRN - CNP as PCP - General (Nurse Practitioner)  Carmela Kerr APRN - CNP as PCP - Empaneled Provider  Carmela Kerr APRN - CNP as Referring Physician (Nurse Practitioner)  Carmela Kerr APRN - CNP as Referring Physician (Nurse Practitioner)    Medical History Review  Past Medical, Family, and Social History reviewed and  contribute to the patient presenting condition    Health Maintenance   Topic Date Due    COVID-19 Vaccine (1) Never done    Varicella vaccine (1 of 2 - 2-dose childhood series) Never done    HIV screen  Never done    Hepatitis C screen  Never done    Polio vaccine (2 of 3 - Adult catch-up series) 11/24/2001    Hepatitis B vaccine (2 of 3 - Hep B Twinrix 3-dose series) 11/09/2007    Diabetes screen  Never done    Lipids  10/27/2023    Depression Monitoring  11/14/2023    Flu vaccine (Season Ended) 08/01/2024    DTaP/Tdap/Td vaccine (2 - Td or Tdap) 01/03/2030    Orthopox (Smallpox/Monkeypox) Vaccine  Completed    Hepatitis A vaccine  Aged Out    Hib vaccine  Aged Out    HPV vaccine  Aged Out    Meningococcal (ACWY) vaccine  Aged Out    Pneumococcal 0-64 years Vaccine  Aged Out       After obtaining consent, and per orders of Carmela Kerr CNP, injection

## 2024-05-06 NOTE — PROGRESS NOTES
[facility]  [address] [place] [address]  [phone]      Jacques Beaver is a 43 y.o. male who presents today for his  medicalconditions/complaints as noted below.  Jacques Beaver is c/o of Foot Problem (Bad athletes foot, going on for over a year, never goes away)    HPI:    HPI  Pt. Here today for evaluation of ongoing foot rash to bilateral feet present for a few years and worsening. Feels he has tried every otc fungal treatment with no relief. Has intense itching often. Now has similar type of rash on left buttock. No change in any personal skin care products. Has also tried hydrocortisone cream with no relief. States he has switched out his shoes a few times and wears clean socks everyday. Is in work boots for long hours.     Stable on BP meds. Has quit drinking alcohol and feeling good overall. Due for routine labs.      Would like refill on nexium. It is working well for GERD control as he did not get as good of relief with pepcid RX.   Past Medical History:   Diagnosis Date    Back pain     Depression     Gastroesophageal reflux disease without esophagitis 12/19/2018    Hypertension     Substance abuse (HCC)     tobacco      Past Surgical History:   Procedure Laterality Date    EYE SURGERY Left     metal removed    TOENAIL EXCISION      WISDOM TOOTH EXTRACTION       Family History   Problem Relation Age of Onset    Hypertension Mother     Migraines Mother     Arthritis Father     No Known Problems Sister     Hypertension Maternal Grandmother     Hypertension Maternal Grandfather     Diabetes Maternal Grandfather     Arthritis Paternal Grandmother     Stroke Paternal Grandfather     Kidney Disease Paternal Grandfather     Arthritis Paternal Grandfather     Heart Disease Other     Lung Cancer Other      Social History     Tobacco Use    Smoking status: Never    Smokeless tobacco: Current     Types: Chew   Substance Use Topics    Alcohol use: Yes     Alcohol/week: 6.0

## 2024-05-07 RX ADMIN — TRIAMCINOLONE ACETONIDE 60 MG: 40 INJECTION, SUSPENSION INTRA-ARTICULAR; INTRAMUSCULAR at 08:14

## 2024-05-13 ENCOUNTER — OFFICE VISIT (OUTPATIENT)
Dept: PRIMARY CARE CLINIC | Age: 44
End: 2024-05-13
Payer: COMMERCIAL

## 2024-05-13 VITALS
WEIGHT: 291 LBS | DIASTOLIC BLOOD PRESSURE: 80 MMHG | HEIGHT: 72 IN | HEART RATE: 73 BPM | OXYGEN SATURATION: 99 % | SYSTOLIC BLOOD PRESSURE: 117 MMHG | BODY MASS INDEX: 39.42 KG/M2

## 2024-05-13 DIAGNOSIS — S29.9XXA TRAUMATIC INJURY OF RIB: Primary | ICD-10-CM

## 2024-05-13 PROCEDURE — 3074F SYST BP LT 130 MM HG: CPT | Performed by: NURSE PRACTITIONER

## 2024-05-13 PROCEDURE — 99213 OFFICE O/P EST LOW 20 MIN: CPT | Performed by: NURSE PRACTITIONER

## 2024-05-13 PROCEDURE — 3079F DIAST BP 80-89 MM HG: CPT | Performed by: NURSE PRACTITIONER

## 2024-05-13 RX ORDER — HYDROCODONE BITARTRATE AND ACETAMINOPHEN 5; 325 MG/1; MG/1
1 TABLET ORAL EVERY 8 HOURS PRN
Qty: 15 TABLET | Refills: 0 | Status: SHIPPED | OUTPATIENT
Start: 2024-05-13 | End: 2024-05-18

## 2024-05-13 ASSESSMENT — ENCOUNTER SYMPTOMS
RESPIRATORY NEGATIVE: 1
WHEEZING: 0
COUGH: 0
SHORTNESS OF BREATH: 0
CHEST TIGHTNESS: 0
VOMITING: 0
NAUSEA: 0
ABDOMINAL PAIN: 0
BACK PAIN: 0

## 2024-05-13 NOTE — PROGRESS NOTES
[facility] PHYSICIANS [facility], [facility]  [address]  [facility] [address]  Dept: [phone]  Dept Fax: [phone]     Jacques Beaver is a 43 y.o. male who presents to the urgent care today for his medicalconditions/complaints as noted below.  Jacques Beaver is c/o of Rib Injury (Left and right fell while working on house Thursday morning. )    HPI:      Chest Pain   This is a new problem. Episode onset: 5 days ago. The onset quality is sudden (fell while working on a roof). The problem occurs constantly. The problem has been unchanged. The pain is present in the lateral region (bilateral ribs). The pain is moderate. The pain does not radiate. Pertinent negatives include no abdominal pain, back pain, claudication, cough, dizziness, exertional chest pressure, fever, headaches, irregular heartbeat, leg pain, lower extremity edema, nausea, shortness of breath or vomiting. The pain is aggravated by deep breathing, lifting, walking and movement. Treatments tried: otc tx. The treatment provided no relief.     Fell while working on a roof. States that the pain is the worse part. Declines XR imaging at this time.    Past Medical History:   Diagnosis Date    Back pain     Depression     Gastroesophageal reflux disease without esophagitis 12/19/2018    Hypertension     Substance abuse (HCC)     tobacco      Current Outpatient Medications   Medication Sig Dispense Refill    HYDROcodone-acetaminophen (NORCO) 5-325 MG per tablet Take 1 tablet by mouth every 8 hours as needed for Pain for up to 5 days. Intended supply: 3 days. Take lowest dose possible to manage pain Max Daily Amount: 3 tablets 15 tablet 0    lisinopril (PRINIVIL;ZESTRIL) 20 MG tablet Take 1 tablet by mouth daily 90 tablet 1    betamethasone dipropionate 0.05 % ointment Apply topically daily. 50 g 0    esomeprazole (NEXIUM) 20 MG delayed release capsule Take 1 capsule by mouth every morning (before

## 2024-10-14 ENCOUNTER — OFFICE VISIT (OUTPATIENT)
Dept: PRIMARY CARE CLINIC | Age: 44
End: 2024-10-14
Payer: COMMERCIAL

## 2024-10-14 VITALS
BODY MASS INDEX: 37.93 KG/M2 | SYSTOLIC BLOOD PRESSURE: 126 MMHG | OXYGEN SATURATION: 100 % | HEIGHT: 72 IN | HEART RATE: 111 BPM | TEMPERATURE: 97.9 F | DIASTOLIC BLOOD PRESSURE: 88 MMHG | WEIGHT: 280 LBS

## 2024-10-14 DIAGNOSIS — B96.89 ACUTE BACTERIAL SINUSITIS: ICD-10-CM

## 2024-10-14 DIAGNOSIS — Z91.038 ALLERGIC REACTION TO INSECT BITE: Primary | ICD-10-CM

## 2024-10-14 DIAGNOSIS — J01.90 ACUTE BACTERIAL SINUSITIS: ICD-10-CM

## 2024-10-14 PROCEDURE — 3079F DIAST BP 80-89 MM HG: CPT

## 2024-10-14 PROCEDURE — 3074F SYST BP LT 130 MM HG: CPT

## 2024-10-14 PROCEDURE — 96372 THER/PROPH/DIAG INJ SC/IM: CPT

## 2024-10-14 PROCEDURE — 99213 OFFICE O/P EST LOW 20 MIN: CPT

## 2024-10-14 RX ORDER — TRIAMCINOLONE ACETONIDE 40 MG/ML
40 INJECTION, SUSPENSION INTRA-ARTICULAR; INTRAMUSCULAR ONCE
Status: COMPLETED | OUTPATIENT
Start: 2024-10-14 | End: 2024-10-14

## 2024-10-14 RX ORDER — PREDNISONE 20 MG/1
40 TABLET ORAL DAILY
Qty: 10 TABLET | Refills: 0 | Status: SHIPPED | OUTPATIENT
Start: 2024-10-15 | End: 2024-10-20

## 2024-10-14 RX ADMIN — TRIAMCINOLONE ACETONIDE 40 MG: 40 INJECTION, SUSPENSION INTRA-ARTICULAR; INTRAMUSCULAR at 18:57

## 2024-10-14 ASSESSMENT — ENCOUNTER SYMPTOMS
RESPIRATORY NEGATIVE: 1
RECTAL PAIN: 0
EYE PAIN: 0
VOMITING: 0
APNEA: 0
BLOOD IN STOOL: 0
WHEEZING: 0
BACK PAIN: 0
CONSTIPATION: 0
PHOTOPHOBIA: 0
FACIAL SWELLING: 0
CHOKING: 0
CHEST TIGHTNESS: 0
DIARRHEA: 0
ABDOMINAL PAIN: 0
EYE REDNESS: 0
EYE ITCHING: 0
SINUS PRESSURE: 1
GASTROINTESTINAL NEGATIVE: 1
SINUS PAIN: 0
SINUS COMPLAINT: 1
RHINORRHEA: 0
VOICE CHANGE: 0
VISUAL CHANGE: 0
ABDOMINAL DISTENTION: 0
SORE THROAT: 0
NAUSEA: 0
COLOR CHANGE: 0
EYES NEGATIVE: 1
SWOLLEN GLANDS: 0
EYE DISCHARGE: 0
STRIDOR: 0
TROUBLE SWALLOWING: 0
CHANGE IN BOWEL HABIT: 0
HOARSE VOICE: 0
ANAL BLEEDING: 0
SHORTNESS OF BREATH: 0
COUGH: 0

## 2024-10-14 NOTE — PROGRESS NOTES
subscore is 5. GCS motor subscore is 6.   Psychiatric:         Mood and Affect: Mood normal.         Behavior: Behavior normal.         Plan:     Assessment & Plan     1. Allergic reaction to insect bite  -     predniSONE (DELTASONE) 20 MG tablet; Take 2 tablets by mouth daily for 5 days, Disp-10 tablet, R-0Normal  -     triamcinolone acetonide (KENALOG-40) injection 40 mg; 40 mg, IntraMUSCular, ONCE, 1 dose, On Mon 10/14/24 at 1900  2. Acute bacterial sinusitis  -     amoxicillin-clavulanate (AUGMENTIN) 875-125 MG per tablet; Take 1 tablet by mouth 2 times daily for 10 days, Disp-20 tablet, R-0Normal     -Continue over-the-counter medications as needed for symptoms such as benadryl prn for itching   -Continue over-the-counter medications as needed for symptoms such as Tylenol/Motrin, otc sinus preparations.    -Use honey to the back of throat, salt water gargle as needed for sore throat, cough.    -Go to the ER for shortness of breath, chest pain.    -Patient verbalized understanding.    Follow Up Instructions:      Return if symptoms worsen or fail to improve, for SOB, chest pain go to ER.    Orders Placed This Encounter   Medications    predniSONE (DELTASONE) 20 MG tablet     Sig: Take 2 tablets by mouth daily for 5 days     Dispense:  10 tablet     Refill:  0    triamcinolone acetonide (KENALOG-40) injection 40 mg    amoxicillin-clavulanate (AUGMENTIN) 875-125 MG per tablet     Sig: Take 1 tablet by mouth 2 times daily for 10 days     Dispense:  20 tablet     Refill:  0           Based on the reported symptoms and the appearance of the rash-- I believe this is allergic reaction at this time.  Kenalog injection administered in the office. Patient tolerated well.  Prednisone sent to the pharmacy to help enable symptom relief. Medication to start tomorrow.  Patient agrees with treatment plan.  Educational materials provided on AVS.  Follow up if symptoms do not improve/worsen.    Based on the duration and severity

## 2024-12-20 DIAGNOSIS — I10 ESSENTIAL HYPERTENSION: ICD-10-CM

## 2024-12-22 RX ORDER — LISINOPRIL 20 MG/1
20 TABLET ORAL DAILY
Qty: 90 TABLET | Refills: 1 | Status: SHIPPED | OUTPATIENT
Start: 2024-12-22

## 2024-12-23 ENCOUNTER — OFFICE VISIT (OUTPATIENT)
Dept: PRIMARY CARE CLINIC | Age: 44
End: 2024-12-23
Payer: COMMERCIAL

## 2024-12-23 VITALS
HEART RATE: 112 BPM | DIASTOLIC BLOOD PRESSURE: 84 MMHG | SYSTOLIC BLOOD PRESSURE: 134 MMHG | TEMPERATURE: 97.5 F | OXYGEN SATURATION: 99 %

## 2024-12-23 DIAGNOSIS — H66.001 NON-RECURRENT ACUTE SUPPURATIVE OTITIS MEDIA OF RIGHT EAR WITHOUT SPONTANEOUS RUPTURE OF TYMPANIC MEMBRANE: Primary | ICD-10-CM

## 2024-12-23 DIAGNOSIS — R09.81 NASAL CONGESTION: ICD-10-CM

## 2024-12-23 PROCEDURE — 3079F DIAST BP 80-89 MM HG: CPT | Performed by: NURSE PRACTITIONER

## 2024-12-23 PROCEDURE — 99213 OFFICE O/P EST LOW 20 MIN: CPT | Performed by: NURSE PRACTITIONER

## 2024-12-23 PROCEDURE — 3075F SYST BP GE 130 - 139MM HG: CPT | Performed by: NURSE PRACTITIONER

## 2024-12-23 RX ORDER — FLUTICASONE PROPIONATE 50 MCG
2 SPRAY, SUSPENSION (ML) NASAL DAILY
Qty: 16 G | Refills: 0 | Status: SHIPPED | OUTPATIENT
Start: 2024-12-23

## 2024-12-23 ASSESSMENT — ENCOUNTER SYMPTOMS
DIARRHEA: 0
SINUS PAIN: 1
RHINORRHEA: 0
CHOKING: 0
CHEST TIGHTNESS: 0
COUGH: 1
WHEEZING: 0
SINUS PRESSURE: 1
STRIDOR: 0
SORE THROAT: 0
SHORTNESS OF BREATH: 0
VOMITING: 0
ABDOMINAL PAIN: 0

## 2024-12-23 NOTE — PROGRESS NOTES
[facility] PHYSICIANS [facility], [facility] IN CARE  [address]  [facility] [address]  Dept: [phone]  Dept Fax: [phone]    Jacques Beaver is a 44 y.o. male who presents to the urgent care today for his medical conditions/complaints as notedbelow.  Jacques Beaver is c/o of Ear Pain (Right ear pain x 4 days ) and Congestion (Sinus congestion, cough x 4 days )      HPI:     44 yr old male presents for possible sinus sx and rt ear infection sx      Ear Pain   There is pain in the right ear. This is a new problem. The current episode started in the past 7 days (4 days). The problem occurs constantly. There has been no fever. Associated symptoms include coughing. Pertinent negatives include no abdominal pain, diarrhea, ear discharge, headaches, hearing loss, neck pain, rash, rhinorrhea, sore throat or vomiting. Treatments tried: mucinex, theraflu. sinus nasal rinse. The treatment provided mild relief.       Past Medical History:   Diagnosis Date    Back pain     Depression     Gastroesophageal reflux disease without esophagitis 12/19/2018    Hypertension     Substance abuse ([facility])     tobacco        Current Outpatient Medications   Medication Sig Dispense Refill    fluticasone (FLONASE) 50 MCG/ACT nasal spray 2 sprays by Each Nostril route daily 16 g 0    amoxicillin-clavulanate (AUGMENTIN) 875-125 MG per tablet Take 1 tablet by mouth 2 times daily for 10 days 20 tablet 0    lisinopril (PRINIVIL;ZESTRIL) 20 MG tablet TAKE 1 TABLET BY MOUTH DAILY 90 tablet 1    betamethasone dipropionate 0.05 % ointment Apply topically daily. 50 g 0    esomeprazole (NEXIUM) 20 MG delayed release capsule Take 1 capsule by mouth every morning (before breakfast) (Patient not taking: Reported on 12/23/2024) 90 capsule 1     No current facility-administered medications for this visit.     Allergies   Allergen Reactions    Bee Venom Swelling    Naproxen Other (See Comments)     Excessive

## 2025-01-20 ENCOUNTER — OFFICE VISIT (OUTPATIENT)
Dept: PRIMARY CARE CLINIC | Age: 45
End: 2025-01-20
Payer: COMMERCIAL

## 2025-01-20 VITALS
TEMPERATURE: 98.4 F | OXYGEN SATURATION: 96 % | DIASTOLIC BLOOD PRESSURE: 79 MMHG | SYSTOLIC BLOOD PRESSURE: 113 MMHG | HEART RATE: 109 BPM

## 2025-01-20 DIAGNOSIS — J01.90 ACUTE BACTERIAL SINUSITIS: Primary | ICD-10-CM

## 2025-01-20 DIAGNOSIS — R05.9 COUGH IN ADULT: ICD-10-CM

## 2025-01-20 DIAGNOSIS — B96.89 ACUTE BACTERIAL SINUSITIS: Primary | ICD-10-CM

## 2025-01-20 PROCEDURE — 3074F SYST BP LT 130 MM HG: CPT | Performed by: NURSE PRACTITIONER

## 2025-01-20 PROCEDURE — 3078F DIAST BP <80 MM HG: CPT | Performed by: NURSE PRACTITIONER

## 2025-01-20 PROCEDURE — 99213 OFFICE O/P EST LOW 20 MIN: CPT | Performed by: NURSE PRACTITIONER

## 2025-01-20 RX ORDER — DEXTROMETHORPHAN HYDROBROMIDE AND PROMETHAZINE HYDROCHLORIDE 15; 6.25 MG/5ML; MG/5ML
5 SYRUP ORAL 4 TIMES DAILY PRN
Qty: 150 ML | Refills: 0 | Status: SHIPPED | OUTPATIENT
Start: 2025-01-20

## 2025-01-20 RX ORDER — DOXYCYCLINE 100 MG/1
100 CAPSULE ORAL 2 TIMES DAILY
Qty: 20 CAPSULE | Refills: 0 | Status: SHIPPED | OUTPATIENT
Start: 2025-01-20 | End: 2025-01-30

## 2025-01-20 RX ORDER — PREDNISONE 20 MG/1
40 TABLET ORAL DAILY
Qty: 10 TABLET | Refills: 0 | Status: SHIPPED | OUTPATIENT
Start: 2025-01-20 | End: 2025-01-25

## 2025-01-20 ASSESSMENT — ENCOUNTER SYMPTOMS
VOICE CHANGE: 0
EYE REDNESS: 0
CHEST TIGHTNESS: 0
EYE DISCHARGE: 0
SINUS PRESSURE: 1
COUGH: 1
SHORTNESS OF BREATH: 0
SORE THROAT: 1
WHEEZING: 0

## 2025-01-20 NOTE — PROGRESS NOTES
[facility] PHYSICIANS [facility], [facility]  [address]  [facility] [address]  Dept: [phone]     Jacques Beaver is a 44 y.o. male who presents to the urgent care today for his medicalconditions/complaints as noted below.  Jacques Beaver is c/o of Cough (Cough, sinus congestion, sore throat x 3 weeks )    HPI:     Sinusitis  This is a new problem. The current episode started more than 1 month ago. The problem has been gradually worsening since onset. There has been no fever. Associated symptoms include congestion, coughing, headaches, sinus pressure and a sore throat. Pertinent negatives include no chills, ear pain, shortness of breath or sneezing. Treatments tried: augmentin. The treatment provided mild relief.       Past Medical History:   Diagnosis Date    Back pain     Depression     Gastroesophageal reflux disease without esophagitis 12/19/2018    Hypertension     Substance abuse (HCC)     tobacco        Current Outpatient Medications   Medication Sig Dispense Refill    doxycycline hyclate (VIBRAMYCIN) 100 MG capsule Take 1 capsule by mouth 2 times daily for 10 days 20 capsule 0    predniSONE (DELTASONE) 20 MG tablet Take 2 tablets by mouth daily for 5 days 10 tablet 0    promethazine-dextromethorphan (PROMETHAZINE-DM) 6.25-15 MG/5ML syrup Take 5 mLs by mouth 4 times daily as needed for Cough 150 mL 0    lisinopril (PRINIVIL;ZESTRIL) 20 MG tablet TAKE 1 TABLET BY MOUTH DAILY 90 tablet 1    fluticasone (FLONASE) 50 MCG/ACT nasal spray 2 sprays by Each Nostril route daily (Patient not taking: Reported on 1/20/2025) 16 g 0    betamethasone dipropionate 0.05 % ointment Apply topically daily. 50 g 0    esomeprazole (NEXIUM) 20 MG delayed release capsule Take 1 capsule by mouth every morning (before breakfast) (Patient not taking: Reported on 12/23/2024) 90 capsule 1     No current facility-administered medications for this visit.     Allergies   Allergen

## 2025-01-27 ENCOUNTER — OFFICE VISIT (OUTPATIENT)
Dept: PRIMARY CARE CLINIC | Age: 45
End: 2025-01-27
Payer: COMMERCIAL

## 2025-01-27 VITALS
HEART RATE: 112 BPM | OXYGEN SATURATION: 96 % | TEMPERATURE: 97.8 F | DIASTOLIC BLOOD PRESSURE: 83 MMHG | SYSTOLIC BLOOD PRESSURE: 131 MMHG

## 2025-01-27 DIAGNOSIS — B96.89 ACUTE BACTERIAL SINUSITIS: Primary | ICD-10-CM

## 2025-01-27 DIAGNOSIS — R05.3 PERSISTENT COUGH FOR 3 WEEKS OR LONGER: ICD-10-CM

## 2025-01-27 DIAGNOSIS — J01.90 ACUTE BACTERIAL SINUSITIS: Primary | ICD-10-CM

## 2025-01-27 PROCEDURE — 3075F SYST BP GE 130 - 139MM HG: CPT

## 2025-01-27 PROCEDURE — 99213 OFFICE O/P EST LOW 20 MIN: CPT

## 2025-01-27 PROCEDURE — 3079F DIAST BP 80-89 MM HG: CPT

## 2025-01-27 RX ORDER — BENZONATATE 100 MG/1
100 CAPSULE ORAL 3 TIMES DAILY PRN
Qty: 30 CAPSULE | Refills: 0 | Status: SHIPPED | OUTPATIENT
Start: 2025-01-27 | End: 2025-02-06

## 2025-01-27 RX ORDER — LEVOFLOXACIN 500 MG/1
500 TABLET, FILM COATED ORAL DAILY
Qty: 5 TABLET | Refills: 0 | Status: SHIPPED | OUTPATIENT
Start: 2025-01-27 | End: 2025-02-01

## 2025-01-27 ASSESSMENT — ENCOUNTER SYMPTOMS
EYE DISCHARGE: 0
HEMOPTYSIS: 0
CHEST TIGHTNESS: 0
CONSTIPATION: 0
FACIAL SWELLING: 0
DIARRHEA: 0
APNEA: 0
RHINORRHEA: 0
CHOKING: 0
NAUSEA: 0
ABDOMINAL DISTENTION: 0
SORE THROAT: 0
EYE REDNESS: 0
EYE PAIN: 0
EYES NEGATIVE: 1
HEARTBURN: 0
ANAL BLEEDING: 0
COLOR CHANGE: 0
ABDOMINAL PAIN: 0
BLOOD IN STOOL: 0
STRIDOR: 0
EYE ITCHING: 0
VOICE CHANGE: 1
SINUS PAIN: 0
WHEEZING: 0
RECTAL PAIN: 0
BACK PAIN: 0
SHORTNESS OF BREATH: 0
GASTROINTESTINAL NEGATIVE: 1
VOMITING: 0
TROUBLE SWALLOWING: 0
SINUS PRESSURE: 0
COUGH: 1
PHOTOPHOBIA: 0

## 2025-01-27 NOTE — PROGRESS NOTES
sinusitis  2. Persistent cough for 3 weeks or longer  -     XR CHEST STANDARD (2 VW); Future     Xray Result (most recent):  XR CHEST (2 VW) 01/27/2025    Narrative  EXAMINATION:  TWO XRAY VIEWS OF THE CHEST    1/27/2025 4:30 pm    COMPARISON:  None.    HISTORY:  ORDERING SYSTEM PROVIDED HISTORY: Persistent cough for 3 weeks or longer  TECHNOLOGIST PROVIDED HISTORY:  coughX1 month, voice hoarseness  Reason for Exam: cough    FINDINGS:  The lungs are without acute focal process.  There is no effusion or  pneumothorax. The cardiomediastinal silhouette is without acute process. The  osseous structures are without acute process.    Impression  No acute process.    -Due to failure of 2 courses of abx, levaquin sent to pharmacy for one more course.  -Continue over-the-counter medications as needed for symptoms such as Tylenol/Motrin, otc cough preparations.    -Use honey to the back of throat, salt water gargle as needed for sore throat, cough.    -Go to the ER for shortness of breath, chest pain.    -Advised f/u with pcp for persisting cough if not better.   -Patient verbalized understanding.    Follow Up Instructions:      Return if symptoms worsen or fail to improve, for SOB, chest pain go to ER.    Orders Placed This Encounter   Medications    benzonatate (TESSALON PERLES) 100 MG capsule     Sig: Take 1 capsule by mouth 3 times daily as needed for Cough     Dispense:  30 capsule     Refill:  0             Patient and/or parent given educational materials - see patient instructions.  Discussed use, benefit, and side effects of prescribed medications.  All patient questions answered.  Patient and/or parent voiced understanding.      Electronically signed by JOSE Iverson 1/28/2025 at 9:42 AM

## 2025-02-05 ENCOUNTER — OFFICE VISIT (OUTPATIENT)
Dept: FAMILY MEDICINE CLINIC | Age: 45
End: 2025-02-05
Payer: COMMERCIAL

## 2025-02-05 VITALS
BODY MASS INDEX: 36.84 KG/M2 | HEIGHT: 72 IN | DIASTOLIC BLOOD PRESSURE: 78 MMHG | HEART RATE: 110 BPM | TEMPERATURE: 97.7 F | OXYGEN SATURATION: 99 % | SYSTOLIC BLOOD PRESSURE: 118 MMHG | WEIGHT: 272 LBS

## 2025-02-05 DIAGNOSIS — R05.8 POST-VIRAL COUGH SYNDROME: Primary | ICD-10-CM

## 2025-02-05 PROCEDURE — 3074F SYST BP LT 130 MM HG: CPT | Performed by: NURSE PRACTITIONER

## 2025-02-05 PROCEDURE — 99213 OFFICE O/P EST LOW 20 MIN: CPT | Performed by: NURSE PRACTITIONER

## 2025-02-05 PROCEDURE — 3078F DIAST BP <80 MM HG: CPT | Performed by: NURSE PRACTITIONER

## 2025-02-05 RX ORDER — PREDNISONE 10 MG/1
TABLET ORAL
Qty: 30 TABLET | Refills: 0 | Status: SHIPPED | OUTPATIENT
Start: 2025-02-05

## 2025-02-05 RX ORDER — ALBUTEROL SULFATE 90 UG/1
2 INHALANT RESPIRATORY (INHALATION) EVERY 6 HOURS PRN
Qty: 18 G | Refills: 0 | Status: SHIPPED | OUTPATIENT
Start: 2025-02-05

## 2025-02-05 SDOH — ECONOMIC STABILITY: FOOD INSECURITY: WITHIN THE PAST 12 MONTHS, THE FOOD YOU BOUGHT JUST DIDN'T LAST AND YOU DIDN'T HAVE MONEY TO GET MORE.: NEVER TRUE

## 2025-02-05 SDOH — ECONOMIC STABILITY: FOOD INSECURITY: WITHIN THE PAST 12 MONTHS, YOU WORRIED THAT YOUR FOOD WOULD RUN OUT BEFORE YOU GOT MONEY TO BUY MORE.: NEVER TRUE

## 2025-02-05 ASSESSMENT — ENCOUNTER SYMPTOMS
SINUS PRESSURE: 0
ABDOMINAL PAIN: 0
CHEST TIGHTNESS: 1
COUGH: 1
TROUBLE SWALLOWING: 0
SORE THROAT: 0
RHINORRHEA: 0
SHORTNESS OF BREATH: 1

## 2025-02-05 ASSESSMENT — PATIENT HEALTH QUESTIONNAIRE - PHQ9
SUM OF ALL RESPONSES TO PHQ QUESTIONS 1-9: 0
2. FEELING DOWN, DEPRESSED OR HOPELESS: NOT AT ALL
1. LITTLE INTEREST OR PLEASURE IN DOING THINGS: NOT AT ALL
8. MOVING OR SPEAKING SO SLOWLY THAT OTHER PEOPLE COULD HAVE NOTICED. OR THE OPPOSITE, BEING SO FIGETY OR RESTLESS THAT YOU HAVE BEEN MOVING AROUND A LOT MORE THAN USUAL: NOT AT ALL
9. THOUGHTS THAT YOU WOULD BE BETTER OFF DEAD, OR OF HURTING YOURSELF: NOT AT ALL
6. FEELING BAD ABOUT YOURSELF - OR THAT YOU ARE A FAILURE OR HAVE LET YOURSELF OR YOUR FAMILY DOWN: NOT AT ALL
SUM OF ALL RESPONSES TO PHQ QUESTIONS 1-9: 0
7. TROUBLE CONCENTRATING ON THINGS, SUCH AS READING THE NEWSPAPER OR WATCHING TELEVISION: NOT AT ALL
5. POOR APPETITE OR OVEREATING: NOT AT ALL
3. TROUBLE FALLING OR STAYING ASLEEP: NOT AT ALL
SUM OF ALL RESPONSES TO PHQ QUESTIONS 1-9: 0
SUM OF ALL RESPONSES TO PHQ9 QUESTIONS 1 & 2: 0
10. IF YOU CHECKED OFF ANY PROBLEMS, HOW DIFFICULT HAVE THESE PROBLEMS MADE IT FOR YOU TO DO YOUR WORK, TAKE CARE OF THINGS AT HOME, OR GET ALONG WITH OTHER PEOPLE: NOT DIFFICULT AT ALL
SUM OF ALL RESPONSES TO PHQ QUESTIONS 1-9: 0
4. FEELING TIRED OR HAVING LITTLE ENERGY: NOT AT ALL
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
## 2025-02-05 NOTE — PROGRESS NOTES
Visit Information    Have you changed or started any medications since your last visit including any over-the-counter medicines, vitamins, or herbal medicines? no   Have you stopped taking any of your medications? Is so, why? -  no  Are you having any side effects from any of your medications? - no    Have you seen any other physician or provider since your last visit?  no   Have you had any other diagnostic tests since your last visit?  no   Have you been seen in the emergency room and/or had an admission in a hospital since we last saw you?  no   Have you had your routine dental cleaning in the past 6 months?  no     Do you have an active MyChart account? If no, what is the barrier?  Yes    Patient Care Team:  Carmela Kerr APRN - CNP as PCP - General (Nurse Practitioner)  Carmela Kerr APRN - CNP as PCP - Empaneled Provider  Carmela Kerr APRN - CNP as Referring Physician (Nurse Practitioner)  Carmela Kerr APRN - CNP as Referring Physician (Nurse Practitioner)    Medical History Review  Past Medical, Family, and Social History reviewed and  contribute to the patient presenting condition    Health Maintenance   Topic Date Due    Varicella vaccine (1 of 2 - 13+ 2-dose series) Never done    Polio vaccine (2 of 3 - Adult catch-up series) 11/24/2001    Hepatitis B vaccine (2 of 3 - Hep B Twinrix 3-dose series) 11/09/2007    Diabetes screen  Never done    Lipids  10/27/2023    Flu vaccine (1) 08/01/2024    COVID-19 Vaccine (1 - 2023-24 season) Never done    Hepatitis C screen  05/06/2025 (Originally 5/25/1999)    HIV screen  05/06/2025 (Originally 5/25/1996)    Depression Monitoring  05/06/2025    DTaP/Tdap/Td vaccine (4 - Td or Tdap) 01/27/2035    Orthopox (Smallpox/Monkeypox) Vaccine  Completed    Hepatitis A vaccine  Aged Out    Hib vaccine  Aged Out    HPV vaccine  Aged Out    Meningococcal (ACWY) vaccine  Aged Out    Pneumococcal 0-64 years Vaccine  Aged Out

## 2025-02-07 LAB
ALBUMIN: 4.4 G/DL
ALP BLD-CCNC: 140 U/L
ALT SERPL-CCNC: 57 U/L
ANION GAP SERPL CALCULATED.3IONS-SCNC: 7 MMOL/L
AST SERPL-CCNC: 28 U/L
BASOPHILS ABSOLUTE: 0.1 /ΜL
BASOPHILS RELATIVE PERCENT: 0.9 %
BILIRUB SERPL-MCNC: 0.8 MG/DL (ref 0.1–1.4)
BUN BLDV-MCNC: 16 MG/DL
CALCIUM SERPL-MCNC: 9.6 MG/DL
CHLORIDE BLD-SCNC: 104 MMOL/L
CHOLESTEROL, TOTAL: 153 MG/DL
CHOLESTEROL, TOTAL: 153 MG/DL
CHOLESTEROL/HDL RATIO: 3.8
CHOLESTEROL/HDL RATIO: 3.8
CO2: 28 MMOL/L
CREAT SERPL-MCNC: 0.91 MG/DL
EOSINOPHILS ABSOLUTE: 0 /ΜL
EOSINOPHILS RELATIVE PERCENT: 0.4 %
ESTIMATED AVERAGE GLUCOSE: 131
ESTIMATED AVERAGE GLUCOSE: 131
GFR, ESTIMATED: >90
GLUCOSE BLD-MCNC: 121 MG/DL
HBA1C MFR BLD: 6.2 %
HBA1C MFR BLD: 6.2 %
HCT VFR BLD CALC: 43.2 % (ref 41–53)
HDLC SERPL-MCNC: 40 MG/DL (ref 35–70)
HDLC SERPL-MCNC: 40 MG/DL (ref 35–70)
HEMOGLOBIN: 14.7 G/DL (ref 13.5–17.5)
LDL CHOLESTEROL: 98
LDL CHOLESTEROL: 98
LYMPHOCYTES ABSOLUTE: 1.9 /ΜL
LYMPHOCYTES RELATIVE PERCENT: 19.2 %
MCH RBC QN AUTO: 30.9 PG
MCHC RBC AUTO-ENTMCNC: 33.9 G/DL
MCV RBC AUTO: 91 FL
MONOCYTES ABSOLUTE: 0.4 /ΜL
MONOCYTES RELATIVE PERCENT: 4 %
NEUTROPHILS ABSOLUTE: 7.4 /ΜL
NEUTROPHILS RELATIVE PERCENT: 75.5 %
NONHDLC SERPL-MCNC: NORMAL MG/DL
NONHDLC SERPL-MCNC: NORMAL MG/DL
PDW BLD-RTO: 13 %
PLATELET # BLD: 410 K/ΜL
PMV BLD AUTO: 8.7 FL
POTASSIUM SERPL-SCNC: 4.3 MMOL/L
PROSTATE SPECIFIC ANTIGEN: 0.46 NG/ML
RBC # BLD: 4.74 10^6/ΜL
SODIUM BLD-SCNC: 139 MMOL/L
TOTAL PROTEIN: 7.3 G/DL (ref 6.4–8.2)
TRIGL SERPL-MCNC: 74 MG/DL
TRIGL SERPL-MCNC: 74 MG/DL
VLDLC SERPL CALC-MCNC: 15 MG/DL
VLDLC SERPL CALC-MCNC: 15 MG/DL
WBC # BLD: 9.8 10^3/ML

## 2025-02-11 ENCOUNTER — OFFICE VISIT (OUTPATIENT)
Dept: FAMILY MEDICINE CLINIC | Age: 45
End: 2025-02-11
Payer: COMMERCIAL

## 2025-02-11 VITALS
BODY MASS INDEX: 37.44 KG/M2 | HEIGHT: 72 IN | WEIGHT: 276.4 LBS | HEART RATE: 96 BPM | TEMPERATURE: 97.5 F | OXYGEN SATURATION: 99 % | DIASTOLIC BLOOD PRESSURE: 82 MMHG | SYSTOLIC BLOOD PRESSURE: 122 MMHG

## 2025-02-11 DIAGNOSIS — R05.8 POST-VIRAL COUGH SYNDROME: Primary | ICD-10-CM

## 2025-02-11 DIAGNOSIS — I10 ESSENTIAL HYPERTENSION: ICD-10-CM

## 2025-02-11 DIAGNOSIS — R93.1 ABNORMAL ECHOCARDIOGRAM: ICD-10-CM

## 2025-02-11 DIAGNOSIS — I51.7 LVH (LEFT VENTRICULAR HYPERTROPHY): ICD-10-CM

## 2025-02-11 DIAGNOSIS — R73.03 PREDIABETES: ICD-10-CM

## 2025-02-11 PROCEDURE — 3079F DIAST BP 80-89 MM HG: CPT | Performed by: NURSE PRACTITIONER

## 2025-02-11 PROCEDURE — 3074F SYST BP LT 130 MM HG: CPT | Performed by: NURSE PRACTITIONER

## 2025-02-11 PROCEDURE — 99214 OFFICE O/P EST MOD 30 MIN: CPT | Performed by: NURSE PRACTITIONER

## 2025-02-11 RX ORDER — METOPROLOL SUCCINATE 25 MG/1
25 TABLET, EXTENDED RELEASE ORAL DAILY
Qty: 30 TABLET | Refills: 0 | Status: SHIPPED | OUTPATIENT
Start: 2025-02-11

## 2025-02-11 RX ORDER — METOPROLOL SUCCINATE 25 MG/1
25 TABLET, EXTENDED RELEASE ORAL DAILY
Qty: 90 TABLET | OUTPATIENT
Start: 2025-02-11

## 2025-02-11 ASSESSMENT — ENCOUNTER SYMPTOMS
VOMITING: 0
NAUSEA: 0
COUGH: 1
WHEEZING: 0
CHEST TIGHTNESS: 0
SHORTNESS OF BREATH: 0
ABDOMINAL PAIN: 0
SINUS PRESSURE: 0
SINUS PAIN: 0
RHINORRHEA: 0

## 2025-02-11 NOTE — PROGRESS NOTES
Visit Information    Have you changed or started any medications since your last visit including any over-the-counter medicines, vitamins, or herbal medicines? no   Have you stopped taking any of your medications? Is so, why? -  no  Are you having any side effects from any of your medications? - no    Have you seen any other physician or provider since your last visit?  no   Have you had any other diagnostic tests since your last visit?  no   Have you been seen in the emergency room and/or had an admission in a hospital since we last saw you?  no   Have you had your routine dental cleaning in the past 6 months?  no     Do you have an active MyChart account? If no, what is the barrier?  Yes    Patient Care Team:  Carmela Kerr APRN - CNP as PCP - General (Nurse Practitioner)  Carmela Kerr APRN - CNP as PCP - Empaneled Provider  Carmela Kerr APRN - CNP as Referring Physician (Nurse Practitioner)  Carmela Kerr APRN - CNP as Referring Physician (Nurse Practitioner)    Medical History Review  Past Medical, Family, and Social History reviewed and  contribute to the patient presenting condition    Health Maintenance   Topic Date Due    Varicella vaccine (1 of 2 - 13+ 2-dose series) Never done    Polio vaccine (2 of 3 - Adult catch-up series) 11/24/2001    Hepatitis B vaccine (2 of 3 - Hep B Twinrix 3-dose series) 11/09/2007    Diabetes screen  Never done    Lipids  10/27/2023    Flu vaccine (1) 08/01/2024    COVID-19 Vaccine (1 - 2024-25 season) Never done    Hepatitis C screen  05/06/2025 (Originally 5/25/1999)    HIV screen  05/06/2025 (Originally 5/25/1996)    Depression Monitoring  02/05/2026    DTaP/Tdap/Td vaccine (4 - Td or Tdap) 01/27/2035    Orthopox (Smallpox/Monkeypox) Vaccine  Completed    Hepatitis A vaccine  Aged Out    Hib vaccine  Aged Out    HPV vaccine  Aged Out    Meningococcal (ACWY) vaccine  Aged Out    Pneumococcal 0-49 years Vaccine  Aged Out             
by JOSE Leiva - CNP,CNP on 2/11/2025 at 9:45 AM

## 2025-02-12 DIAGNOSIS — Z13.220 LIPID SCREENING: ICD-10-CM

## 2025-02-12 DIAGNOSIS — Z00.00 ROUTINE GENERAL MEDICAL EXAMINATION AT A HEALTH CARE FACILITY: ICD-10-CM

## 2025-02-12 DIAGNOSIS — Z12.5 SCREENING FOR MALIGNANT NEOPLASM OF PROSTATE: ICD-10-CM

## 2025-02-12 DIAGNOSIS — Z13.1 DIABETES MELLITUS SCREENING: ICD-10-CM

## 2025-02-24 DIAGNOSIS — I10 ESSENTIAL HYPERTENSION: Primary | ICD-10-CM

## 2025-02-27 RX ORDER — CARVEDILOL 25 MG/1
25 TABLET ORAL 2 TIMES DAILY
Qty: 60 TABLET | Refills: 3 | Status: SHIPPED | OUTPATIENT
Start: 2025-02-27

## 2025-04-07 ENCOUNTER — OFFICE VISIT (OUTPATIENT)
Dept: FAMILY MEDICINE CLINIC | Age: 45
End: 2025-04-07
Payer: COMMERCIAL

## 2025-04-07 VITALS
HEART RATE: 90 BPM | WEIGHT: 296.6 LBS | SYSTOLIC BLOOD PRESSURE: 114 MMHG | BODY MASS INDEX: 40.17 KG/M2 | TEMPERATURE: 97 F | DIASTOLIC BLOOD PRESSURE: 62 MMHG | OXYGEN SATURATION: 98 % | HEIGHT: 72 IN

## 2025-04-07 DIAGNOSIS — L30.9 FOOT DERMATITIS: Primary | ICD-10-CM

## 2025-04-07 DIAGNOSIS — G47.9 RESTLESS SLEEPER: ICD-10-CM

## 2025-04-07 DIAGNOSIS — G47.30 OBSERVED SLEEP APNEA: ICD-10-CM

## 2025-04-07 DIAGNOSIS — R40.0 DAYTIME SLEEPINESS: ICD-10-CM

## 2025-04-07 DIAGNOSIS — E66.01 MORBID OBESITY WITH BMI OF 40.0-44.9, ADULT: ICD-10-CM

## 2025-04-07 DIAGNOSIS — R06.83 SNORING: ICD-10-CM

## 2025-04-07 DIAGNOSIS — M79.642 LEFT HAND PAIN: ICD-10-CM

## 2025-04-07 PROCEDURE — 3078F DIAST BP <80 MM HG: CPT | Performed by: NURSE PRACTITIONER

## 2025-04-07 PROCEDURE — 99214 OFFICE O/P EST MOD 30 MIN: CPT | Performed by: NURSE PRACTITIONER

## 2025-04-07 PROCEDURE — 3074F SYST BP LT 130 MM HG: CPT | Performed by: NURSE PRACTITIONER

## 2025-04-07 ASSESSMENT — ENCOUNTER SYMPTOMS
VOMITING: 0
CHEST TIGHTNESS: 0
NAUSEA: 0
ABDOMINAL PAIN: 0
SHORTNESS OF BREATH: 0
APNEA: 1
COUGH: 0

## 2025-04-07 NOTE — PROGRESS NOTES
Visit Information    Have you changed or started any medications since your last visit including any over-the-counter medicines, vitamins, or herbal medicines? no   Have you stopped taking any of your medications? Is so, why? -  no  Are you having any side effects from any of your medications? - no    Have you seen any other physician or provider since your last visit?  no   Have you had any other diagnostic tests since your last visit?  no   Have you been seen in the emergency room and/or had an admission in a hospital since we last saw you?  no   Have you had your routine dental cleaning in the past 6 months?  no     Do you have an active MyChart account? If no, what is the barrier?  Yes    Patient Care Team:  Carmela Kerr APRN - CNP as PCP - General (Nurse Practitioner)  Carmela Kerr APRN - CNP as PCP - Empaneled Provider  Carmela Kerr APRN - CNP as Referring Physician (Nurse Practitioner)  Carmela Kerr APRN - CNP as Referring Physician (Nurse Practitioner)    Medical History Review  Past Medical, Family, and Social History reviewed and  contribute to the patient presenting condition    Health Maintenance   Topic Date Due    Varicella vaccine (1 of 2 - 13+ 2-dose series) Never done    Polio vaccine (2 of 3 - Adult catch-up series) 11/24/2001    Hepatitis B vaccine (2 of 3 - Hep B Twinrix 3-dose series) 11/09/2007    COVID-19 Vaccine (1 - 2024-25 season) Never done    Hepatitis C screen  05/06/2025 (Originally 5/25/1999)    HIV screen  05/06/2025 (Originally 5/25/1996)    Flu vaccine (Season Ended) 08/01/2025    Depression Monitoring  02/05/2026    A1C test (Diabetic or Prediabetic)  02/07/2026    Lipids  02/07/2030    DTaP/Tdap/Td vaccine (4 - Td or Tdap) 01/27/2035    Orthopox (Smallpox/Monkeypox) Vaccine  Completed    Hepatitis A vaccine  Aged Out    Hib vaccine  Aged Out    HPV vaccine  Aged Out    Meningococcal (ACWY) vaccine  Aged Out    Meningococcal B vaccine  Aged Out    
diet  Weight loss advised    Derm:  He declines foot exam today  F/u  with derm as requested    given educational materials - see patient instructions.  Discussed use,benefit, and side effects of prescribed medications.  All patient questions answered.Pt voiced understanding. Reviewed health maintenance.  Instructed to continue currentmedications, diet and exercise.    Electronically signed by JOSE Leiva CNP,CNP on 4/7/2025 at 4:25 PM

## 2025-04-11 DIAGNOSIS — I10 ESSENTIAL HYPERTENSION: ICD-10-CM

## 2025-04-11 DIAGNOSIS — K21.9 GASTROESOPHAGEAL REFLUX DISEASE WITHOUT ESOPHAGITIS: ICD-10-CM

## 2025-04-13 RX ORDER — LISINOPRIL 20 MG/1
20 TABLET ORAL DAILY
Qty: 30 TABLET | Refills: 3 | Status: SHIPPED | OUTPATIENT
Start: 2025-04-13

## 2025-04-13 RX ORDER — FAMOTIDINE 40 MG/1
40 TABLET, FILM COATED ORAL DAILY
Qty: 30 TABLET | Refills: 11 | Status: SHIPPED | OUTPATIENT
Start: 2025-04-13

## 2025-04-24 ENCOUNTER — HOSPITAL ENCOUNTER (OUTPATIENT)
Dept: MRI IMAGING | Age: 45
Discharge: HOME | End: 2025-04-26
Payer: COMMERCIAL

## 2025-04-24 ENCOUNTER — HOSPITAL ENCOUNTER (OUTPATIENT)
Dept: GENERAL RADIOLOGY | Age: 45
Discharge: HOME | End: 2025-04-26
Payer: COMMERCIAL

## 2025-04-24 DIAGNOSIS — Z01.89 ENCOUNTER FOR IMAGING TO SCREEN FOR METAL PRIOR TO MRI: ICD-10-CM

## 2025-04-24 DIAGNOSIS — M79.642 LEFT HAND PAIN: ICD-10-CM

## 2025-04-24 PROCEDURE — 76016 MR SAFETY DETER PHYS/QHP: CPT

## 2025-04-24 PROCEDURE — 70030 X-RAY EYE FOR FOREIGN BODY: CPT

## 2025-04-25 ENCOUNTER — RESULTS FOLLOW-UP (OUTPATIENT)
Dept: FAMILY MEDICINE CLINIC | Age: 45
End: 2025-04-25

## 2025-04-25 DIAGNOSIS — S62.92XA CLOSED FRACTURE OF LEFT HAND, INITIAL ENCOUNTER: Primary | ICD-10-CM

## 2025-04-28 ENCOUNTER — HOSPITAL ENCOUNTER (OUTPATIENT)
Dept: SLEEP CENTER | Age: 45
Discharge: HOME | End: 2025-04-30
Payer: COMMERCIAL

## 2025-04-28 VITALS — BODY MASS INDEX: 37.93 KG/M2 | HEIGHT: 72 IN | WEIGHT: 280 LBS

## 2025-04-28 DIAGNOSIS — R06.83 SNORING: ICD-10-CM

## 2025-04-28 DIAGNOSIS — R40.0 DAYTIME SLEEPINESS: ICD-10-CM

## 2025-04-28 DIAGNOSIS — E66.01 MORBID OBESITY WITH BMI OF 40.0-44.9, ADULT (HCC): ICD-10-CM

## 2025-04-28 DIAGNOSIS — G47.9 RESTLESS SLEEPER: ICD-10-CM

## 2025-04-28 DIAGNOSIS — G47.30 OBSERVED SLEEP APNEA: ICD-10-CM

## 2025-04-28 PROCEDURE — 95810 POLYSOM 6/> YRS 4/> PARAM: CPT

## 2025-05-05 ENCOUNTER — RESULTS FOLLOW-UP (OUTPATIENT)
Dept: FAMILY MEDICINE CLINIC | Age: 45
End: 2025-05-05

## 2025-05-05 DIAGNOSIS — G47.33 OSA (OBSTRUCTIVE SLEEP APNEA): Primary | ICD-10-CM

## 2025-05-05 DIAGNOSIS — M79.642 LEFT HAND PAIN: Primary | ICD-10-CM

## 2025-05-05 NOTE — PATIENT INSTRUCTIONS
PATIENTIQ:  PatientIQ helps [facility] stay in touch with you to know how you're feeling, and provides education and care instructions to you at various time points.   Your answers help your care team track your progress to provide the best care possible. PatientIQ will contact you pre-op and post-op via email or text with:  Educational Videos and Care Instructions  Questionnaires About How You're Feeling    Your participation provides you valuable education and helps [facility] continue to provide quality care to all patients. Thank you

## 2025-05-06 ENCOUNTER — OFFICE VISIT (OUTPATIENT)
Dept: ORTHOPEDIC SURGERY | Age: 45
End: 2025-05-06
Payer: COMMERCIAL

## 2025-05-06 VITALS — WEIGHT: 283.8 LBS | OXYGEN SATURATION: 98 % | RESPIRATION RATE: 16 BRPM | HEIGHT: 72 IN | BODY MASS INDEX: 38.44 KG/M2

## 2025-05-06 DIAGNOSIS — S62.345A CLOSED NONDISPLACED FRACTURE OF BASE OF FOURTH METACARPAL BONE OF LEFT HAND, INITIAL ENCOUNTER: Primary | ICD-10-CM

## 2025-05-06 DIAGNOSIS — M19.132 POST-TRAUMATIC ARTHRITIS OF LEFT WRIST: ICD-10-CM

## 2025-05-06 PROCEDURE — 99203 OFFICE O/P NEW LOW 30 MIN: CPT | Performed by: PHYSICIAN ASSISTANT
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
## 2025-05-07 ASSESSMENT — ENCOUNTER SYMPTOMS
VOMITING: 0
COLOR CHANGE: 0
SHORTNESS OF BREATH: 0
COUGH: 0

## 2025-05-22 ENCOUNTER — HOSPITAL ENCOUNTER (OUTPATIENT)
Dept: SLEEP CENTER | Age: 45
Discharge: HOME | End: 2025-05-24
Payer: COMMERCIAL

## 2025-05-22 DIAGNOSIS — G47.33 OSA (OBSTRUCTIVE SLEEP APNEA): ICD-10-CM

## 2025-05-22 PROCEDURE — 95811 POLYSOM 6/>YRS CPAP 4/> PARM: CPT

## 2025-06-23 ENCOUNTER — OFFICE VISIT (OUTPATIENT)
Dept: FAMILY MEDICINE CLINIC | Age: 45
End: 2025-06-23
Payer: COMMERCIAL

## 2025-06-23 VITALS
WEIGHT: 293.8 LBS | HEIGHT: 72 IN | HEART RATE: 92 BPM | TEMPERATURE: 97.2 F | BODY MASS INDEX: 39.8 KG/M2 | DIASTOLIC BLOOD PRESSURE: 72 MMHG | SYSTOLIC BLOOD PRESSURE: 120 MMHG | OXYGEN SATURATION: 98 %

## 2025-06-23 DIAGNOSIS — K21.9 GASTROESOPHAGEAL REFLUX DISEASE WITHOUT ESOPHAGITIS: ICD-10-CM

## 2025-06-23 DIAGNOSIS — S62.92XA CLOSED FRACTURE OF LEFT HAND, INITIAL ENCOUNTER: Primary | ICD-10-CM

## 2025-06-23 PROCEDURE — 99213 OFFICE O/P EST LOW 20 MIN: CPT | Performed by: NURSE PRACTITIONER

## 2025-06-23 PROCEDURE — 3078F DIAST BP <80 MM HG: CPT | Performed by: NURSE PRACTITIONER

## 2025-06-23 PROCEDURE — 3074F SYST BP LT 130 MM HG: CPT | Performed by: NURSE PRACTITIONER

## 2025-06-23 RX ORDER — ESOMEPRAZOLE MAGNESIUM 40 MG/1
40 CAPSULE, DELAYED RELEASE ORAL
Qty: 90 CAPSULE | Refills: 1 | Status: SHIPPED | OUTPATIENT
Start: 2025-06-23

## 2025-06-23 ASSESSMENT — ENCOUNTER SYMPTOMS
ABDOMINAL PAIN: 0
VOMITING: 0
NAUSEA: 0
CHEST TIGHTNESS: 0
COUGH: 0
SHORTNESS OF BREATH: 0

## 2025-06-23 NOTE — PROGRESS NOTES
Visit Information    Have you changed or started any medications since your last visit including any over-the-counter medicines, vitamins, or herbal medicines? no   Have you stopped taking any of your medications? Is so, why? -  no  Are you having any side effects from any of your medications? - no    Have you seen any other physician or provider since your last visit?  no   Have you had any other diagnostic tests since your last visit?  no   Have you been seen in the emergency room and/or had an admission in a hospital since we last saw you?  no   Have you had your routine dental cleaning in the past 6 months?  no     Do you have an active MyChart account? If no, what is the barrier?  Yes    Patient Care Team:  Carmela Kerr APRN - CNP as PCP - General (Nurse Practitioner)  Carmela Kerr APRN - CNP as PCP - Empaneled Provider  Carmela Kerr APRN - CNP as Referring Physician (Nurse Practitioner)  Carmela Kerr APRN - CNP as Referring Physician (Nurse Practitioner)    Medical History Review  Past Medical, Family, and Social History reviewed and  contribute to the patient presenting condition    Health Maintenance   Topic Date Due    Varicella vaccine (1 of 2 - 13+ 2-dose series) Never done    HIV screen  Never done    Hepatitis C screen  Never done    Polio vaccine (2 of 3 - Adult catch-up series) 11/24/2001    Hepatitis B vaccine (2 of 3 - Hep B Twinrix 3-dose series) 11/09/2007    COVID-19 Vaccine (1 - 2024-25 season) Never done    Flu vaccine (Season Ended) 08/01/2025    Depression Monitoring  02/05/2026    A1C test (Diabetic or Prediabetic)  02/07/2026    Lipids  02/07/2030    DTaP/Tdap/Td vaccine (4 - Td or Tdap) 01/27/2035    Orthopox (Smallpox/Monkeypox) Vaccine  Completed    Hepatitis A vaccine  Aged Out    Hib vaccine  Aged Out    HPV vaccine  Aged Out    Meningococcal (ACWY) vaccine  Aged Out    Meningococcal B vaccine  Aged Out    Pneumococcal 0-49 years Vaccine  Aged Out    Diabetes
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
## 2025-07-07 ENCOUNTER — HOSPITAL ENCOUNTER (OUTPATIENT)
Facility: CLINIC | Age: 45
Setting detail: THERAPIES SERIES
Discharge: HOME | End: 2025-07-07
Payer: COMMERCIAL

## 2025-07-07 PROCEDURE — 97110 THERAPEUTIC EXERCISES: CPT

## 2025-07-07 PROCEDURE — 97165 OT EVAL LOW COMPLEX 30 MIN: CPT
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
## 2025-07-15 ENCOUNTER — HOSPITAL ENCOUNTER (OUTPATIENT)
Facility: CLINIC | Age: 45
Setting detail: THERAPIES SERIES
Discharge: HOME | End: 2025-07-15
Payer: COMMERCIAL

## 2025-07-15 PROCEDURE — 97140 MANUAL THERAPY 1/> REGIONS: CPT

## 2025-07-15 PROCEDURE — 97110 THERAPEUTIC EXERCISES: CPT

## 2025-07-15 NOTE — FLOWSHEET NOTE
decrease pain in UE for safe completion of ADLs     STG/LTG  Short Term Goals/Long Term Goals: (  10   treatments )  Pain: Pt. To report no greater than 2/10 pain during the completion of ADLs/IADLs for improved functional performance.  ROM: Pt. To increase active L wrist flex to 55 degrees for improved ability to prepare food in the kitchen with use of pots/skillets.  ROM: Pt. To increase active L wrist ext to 64 for improved ability to bear weight through an outstretched LUE when completing a sit-to-stand transfer from a chair, or bed.  ROM: Pt. To increase active L I/L/R/S digits flex in a \"hook\" fist position to touch the palm at 0/0/0/0 (cm) for improved ability to grasp and carry camping bags/buckets.  Strength (pounds): Pt will demonstrate increased L hand  strength to 120 lbs for improved ability to complete work-related tasks.  Strength (pounds): Pt will demonstrate increased L hand 3-point pinch strength to 24 lbs for improved ability to open caps on small containers.  Function: Improved UE Functional Index Score to  78/80 for increased functional abilities.  Patient to be independent with home exercise program as demonstrated by performance with correct form without cues.     Patient Goals: Pt. Has a desire to have his finger fixed.       Pt. Education:  [] Yes  [] No  [x] Reviewed Prior HEP/Ed  Method of Education: [x] Verbal  [x] Demo  [] Written  Re:  HEP  Comprehension of Education:  [x] Verbalizes understanding.  [x] Demonstrates understanding.  [] Needs review.  [] Demonstrates/verbalizes HEP/Ed previously given.      Plan: [x] Continue current frequency toward short and long term goals.  [x] Specific Instructions for subsequent treatments: as above   [] Other:       Time In: 0901  Time Out: 0947        Treatment Charges: Mins Units ([facility]) Time In/Out:   []  Modalities:       []  Ultrasound      [x]  Ther Exercise 36 2 [id number]   [x]  Manual Therapy 10 1 [id number]   []  Ther Activities

## 2025-07-18 ENCOUNTER — HOSPITAL ENCOUNTER (OUTPATIENT)
Facility: CLINIC | Age: 45
Setting detail: THERAPIES SERIES
Discharge: HOME | End: 2025-07-18
Payer: COMMERCIAL

## 2025-07-18 PROCEDURE — 97110 THERAPEUTIC EXERCISES: CPT

## 2025-07-18 PROCEDURE — 97140 MANUAL THERAPY 1/> REGIONS: CPT

## 2025-07-18 NOTE — FLOWSHEET NOTE
[] [facility]  Outpatient Rehabilitation &  Therapy  [address]  [phone]  F: [phone] [x] [facility]  Outpatient Rehabilitation &  Therapy  [address]   [address]  P: [phone]  F: [phone] [] [facility]  Outpatient Rehabilitation &  Therapy  [address]  P: [phone]  F: [phone] [] [facility]  Outpatient Rehabilitation &  Therapy  [address]   P: [phone]  F: [phone] [] [facility]   Outpatient Rehabilitation   & Therapy  [address]  P: [phone]   F: [phone]     Occupational Therapy Daily Treatment Note    Date:  2025  Patient Name:  Jacques Beaver    :  1980  MRN: [id number]  Referring Provider: Carmela Kerr APRN - CNP   Insurance: [facility] PPO, 20 visits per calendar year, No Hard Max, AUTH after 20th visit  Medical Diagnosis: Closed fracture of left hand [S62.92XD]   Rehab Codes: pain in left hand M79.642, pain in left wrist M25.532, stiffness in left hand M25.642, stiffness in left wrist M25.632, muscle weakness generalized M62.81 , and edema localized R60.0   Onset Date: 25   Surgery Date: N/A       Next  Appt: 25 with Dr. Paredes     Insurance/Authorization as of Initial Eval: 20 visits per calendar year, No Hard Max, AUTH after 20th visit       Visit# / total visits: 3/10; Progress note for Medicare patient due at visit 10    Cancels/No Shows: 0/0    Subjective:    Pain:  [] Yes  [x] No Location:  N/A Pain Rating: (0-10 scale) 0/10  Pain altered Tx:  [x] No  [] Yes  Action: N/A      Pt Comments:  Pt. Reports that he went fishing on Monday and experienced no pain at his L hand. He explains that his HEP is going well overall. Pt. Reports that he had no soreness following last session.    Precautions [x] No  [] Yes: :  Surgery procedure and date:     Objective:    Date of Measurements   Rt  Lt             Shoulder

## 2025-07-21 ENCOUNTER — HOSPITAL ENCOUNTER (OUTPATIENT)
Facility: CLINIC | Age: 45
Setting detail: THERAPIES SERIES
Discharge: HOME | End: 2025-07-21
Payer: COMMERCIAL

## 2025-07-21 PROCEDURE — 97110 THERAPEUTIC EXERCISES: CPT

## 2025-07-21 NOTE — FLOWSHEET NOTE
[] [facility]  Outpatient Rehabilitation &  Therapy  [address]  [phone]  F: [phone] [x] [place]  Outpatient Rehabilitation &  Therapy  [address]   [address]  P: [phone]  F: [phone] [] [facility]  Outpatient Rehabilitation &  Therapy  [address]  P: [phone]  F: [phone] [] [facility]  Outpatient Rehabilitation &  Therapy  [address]   P: [phone]  F: [phone] [] [facility]   Outpatient Rehabilitation   & Therapy  [address]  P: [phone]   F: [phone]     Occupational Therapy Daily Treatment Note    Date:  2025  Patient Name:  Jacques Beaver    :  1980  MRN: [id number]  Referring Provider: Carmela Kerr APRN - CNP   Insurance: [facility] PPO, 20 visits per calendar year, No Hard Max, AUTH after 20th visit  Medical Diagnosis: Closed fracture of left hand [S62.92XD]   Rehab Codes: pain in left hand M79.642, pain in left wrist M25.532, stiffness in left hand M25.642, stiffness in left wrist M25.632, muscle weakness generalized M62.81 , and edema localized R60.0   Onset Date: 25   Surgery Date: N/A       Next  Appt: 25 with Dr. Paredes     Insurance/Authorization as of Initial Eval: 20 visits per calendar year, No Hard Max, AUTH after 20th visit       Visit# / total visits: 4/10; Progress note for Medicare patient due at visit 10    Cancels/No Shows: 0/0    Subjective:    Pain:  [] Yes  [x] No Location:  N/A Pain Rating: (0-10 scale) 0/10  Pain altered Tx:  [x] No  [] Yes  Action: N/A      Pt Comments:  Pt. Reports that he purchased kinesiology tape and presented to the clinic with it applied to his radial/ulnar LMF/LRF.    Precautions [x] No  [] Yes: :  Surgery procedure and date:     Objective:    Date of Measurements   Rt  Lt             Shoulder Flex WFL  WFL   Elbow ext/flex   WFL  WFL   FA sup/pro    WFL  WFL

## 2025-07-24 ENCOUNTER — HOSPITAL ENCOUNTER (OUTPATIENT)
Facility: CLINIC | Age: 45
Setting detail: THERAPIES SERIES
Discharge: HOME | End: 2025-07-24
Payer: COMMERCIAL

## 2025-07-24 PROCEDURE — 97110 THERAPEUTIC EXERCISES: CPT

## 2025-07-24 NOTE — FLOWSHEET NOTE
Shoulder Flex WFL  WFL   Elbow ext/flex   WFL  WFL   FA sup/pro    WFL  WFL   Hook \"Claw\" Fist to palm  7/18/25, post-tx  WFL I/L/R/S at 0, 0, 0, 0 (cm)   Wrist ext/flex   7/21/25 post-tx   66/57  69/70   Thumb Opp   WFL  WFL   Digit flex from the DPC (cm)  WFL  WFL            (pounds)  7/21/25 post-tx 128.5 lbs (2 trials) 112.5 lbs (2 trials)    3 point pinch (pounds)  7/21/25 post-tx 27 lbs 24 lbs   9-Hole Peg Test 22.55 sec. 20.09 sec     Today's Treatment:  Exercise Flow Sheet:  Exercise Reps/Time Weight/Level Comments Completed   HEP  LUE, [employer] Initiated/Edu/Demo/Practice    Heated Stretch 10 min LUE LRF strapped into flex coban X   PROM Stretching 10x ea  LUE, PROM  LUE, PROM All digits LRF/LMF intrinsics  Wrist flex in supination x    MCP Push-Ups 20x  LUE MCP push-ups with highlighter X    Putty   LUE, Tan  LUE, Tan  LUE, Tan  LUE, Dunn  WB through outstretched LUE  Rolling   strengthening squeezes  3-point pinching -   Metal  Strengthener 10x LUE, 5th hole  strengthening squeezes X   Intrinsic cubes  10x2 ea  green/blue   ADD   X  X   WB   tan tabletop -   Digi-  20x ea  Green    isolate X  x   WEB 20x ea Green    Press with slight discomfort  X  X   Kinesiology Tape ~ 3 min LUE Kinesiology tape was applied over the dorsal/volar LRF MCPj Pt using prn prefers entire finger covered for edema mgmt      Specific Instructions for Next Treatment: HEP review as needed, Heated Stretch with coban/buddy loop, STM to volar/dorsal L FA, thenars, AROM, AAROM, PROM, MP \"Pull-ups\" with a wooden dowel, Putty WB as tolerated, Wall push-ups as tolerated, Progress strengthening     HEP ([facility])  Access Code: [email or web address]  URL: [email or web address]  Date: 07/07/2025  Prepared by: Jacques Mcintosh     Exercises  - Wrist AROM Flexion Extension  - 1 x daily - 7 x weekly - 3 sets - 10 reps  - Seated Wrist Flexion Stretch  - 1 x daily - 7 x weekly - 3 sets - 10 reps  - Wrist Prayer

## 2025-08-15 ENCOUNTER — HOSPITAL ENCOUNTER (OUTPATIENT)
Facility: CLINIC | Age: 45
Setting detail: THERAPIES SERIES
Discharge: HOME | End: 2025-08-15